# Patient Record
Sex: FEMALE | Race: WHITE | NOT HISPANIC OR LATINO | URBAN - METROPOLITAN AREA
[De-identification: names, ages, dates, MRNs, and addresses within clinical notes are randomized per-mention and may not be internally consistent; named-entity substitution may affect disease eponyms.]

---

## 2017-10-12 ENCOUNTER — EMERGENCY (EMERGENCY)
Facility: HOSPITAL | Age: 27
LOS: 0 days | Discharge: HOME | End: 2017-10-12

## 2017-10-12 DIAGNOSIS — N83.202 UNSPECIFIED OVARIAN CYST, LEFT SIDE: ICD-10-CM

## 2017-10-12 DIAGNOSIS — R10.9 UNSPECIFIED ABDOMINAL PAIN: ICD-10-CM

## 2017-10-12 DIAGNOSIS — N83.201 UNSPECIFIED OVARIAN CYST, RIGHT SIDE: ICD-10-CM

## 2017-10-12 DIAGNOSIS — O99.89 OTHER SPECIFIED DISEASES AND CONDITIONS COMPLICATING PREGNANCY, CHILDBIRTH AND THE PUERPERIUM: ICD-10-CM

## 2018-06-16 ENCOUNTER — EMERGENCY (EMERGENCY)
Facility: HOSPITAL | Age: 28
LOS: 0 days | Discharge: HOME | End: 2018-06-16
Attending: EMERGENCY MEDICINE | Admitting: EMERGENCY MEDICINE

## 2018-06-16 VITALS
SYSTOLIC BLOOD PRESSURE: 122 MMHG | OXYGEN SATURATION: 100 % | RESPIRATION RATE: 18 BRPM | HEART RATE: 84 BPM | DIASTOLIC BLOOD PRESSURE: 71 MMHG | TEMPERATURE: 96 F

## 2018-06-16 VITALS
HEART RATE: 79 BPM | HEIGHT: 70 IN | OXYGEN SATURATION: 100 % | SYSTOLIC BLOOD PRESSURE: 96 MMHG | TEMPERATURE: 98 F | RESPIRATION RATE: 18 BRPM | WEIGHT: 293 LBS | DIASTOLIC BLOOD PRESSURE: 59 MMHG

## 2018-06-16 DIAGNOSIS — R19.7 DIARRHEA, UNSPECIFIED: ICD-10-CM

## 2018-06-16 DIAGNOSIS — R68.83 CHILLS (WITHOUT FEVER): ICD-10-CM

## 2018-06-16 DIAGNOSIS — R10.84 GENERALIZED ABDOMINAL PAIN: ICD-10-CM

## 2018-06-16 DIAGNOSIS — R11.2 NAUSEA WITH VOMITING, UNSPECIFIED: ICD-10-CM

## 2018-06-16 LAB
ALBUMIN SERPL ELPH-MCNC: 4.3 G/DL — SIGNIFICANT CHANGE UP (ref 3.5–5.2)
ALP SERPL-CCNC: 58 U/L — SIGNIFICANT CHANGE UP (ref 30–115)
ALT FLD-CCNC: 18 U/L — SIGNIFICANT CHANGE UP (ref 0–41)
ANION GAP SERPL CALC-SCNC: 13 MMOL/L — SIGNIFICANT CHANGE UP (ref 7–14)
APPEARANCE UR: CLEAR — SIGNIFICANT CHANGE UP
AST SERPL-CCNC: 22 U/L — SIGNIFICANT CHANGE UP (ref 0–41)
BACTERIA # UR AUTO: (no result)
BASOPHILS # BLD AUTO: 0.01 K/UL — SIGNIFICANT CHANGE UP (ref 0–0.2)
BASOPHILS NFR BLD AUTO: 0.3 % — SIGNIFICANT CHANGE UP (ref 0–1)
BILIRUB DIRECT SERPL-MCNC: <0.2 MG/DL — SIGNIFICANT CHANGE UP (ref 0–0.2)
BILIRUB INDIRECT FLD-MCNC: >0.1 MG/DL — LOW (ref 0.2–1.2)
BILIRUB SERPL-MCNC: 0.3 MG/DL — SIGNIFICANT CHANGE UP (ref 0.2–1.2)
BILIRUB UR-MCNC: NEGATIVE — SIGNIFICANT CHANGE UP
BUN SERPL-MCNC: 10 MG/DL — SIGNIFICANT CHANGE UP (ref 10–20)
CALCIUM SERPL-MCNC: 9.3 MG/DL — SIGNIFICANT CHANGE UP (ref 8.5–10.1)
CHLORIDE SERPL-SCNC: 102 MMOL/L — SIGNIFICANT CHANGE UP (ref 98–110)
CO2 SERPL-SCNC: 23 MMOL/L — SIGNIFICANT CHANGE UP (ref 17–32)
COLOR SPEC: YELLOW — SIGNIFICANT CHANGE UP
CREAT SERPL-MCNC: 0.8 MG/DL — SIGNIFICANT CHANGE UP (ref 0.7–1.5)
DIFF PNL FLD: NEGATIVE — SIGNIFICANT CHANGE UP
EOSINOPHIL # BLD AUTO: 0.04 K/UL — SIGNIFICANT CHANGE UP (ref 0–0.7)
EOSINOPHIL NFR BLD AUTO: 1.1 % — SIGNIFICANT CHANGE UP (ref 0–8)
EPI CELLS # UR: (no result) /HPF
GLUCOSE SERPL-MCNC: 86 MG/DL — SIGNIFICANT CHANGE UP (ref 70–99)
GLUCOSE UR QL: NEGATIVE MG/DL — SIGNIFICANT CHANGE UP
HCT VFR BLD CALC: 42 % — SIGNIFICANT CHANGE UP (ref 37–47)
HGB BLD-MCNC: 14.4 G/DL — SIGNIFICANT CHANGE UP (ref 12–16)
IMM GRANULOCYTES NFR BLD AUTO: 0.3 % — SIGNIFICANT CHANGE UP (ref 0.1–0.3)
KETONES UR-MCNC: (no result)
LACTATE SERPL-SCNC: 1.1 MMOL/L — SIGNIFICANT CHANGE UP (ref 0.5–2.2)
LEUKOCYTE ESTERASE UR-ACNC: (no result)
LIDOCAIN IGE QN: 24 U/L — SIGNIFICANT CHANGE UP (ref 7–60)
LYMPHOCYTES # BLD AUTO: 0.82 K/UL — LOW (ref 1.2–3.4)
LYMPHOCYTES # BLD AUTO: 21.8 % — SIGNIFICANT CHANGE UP (ref 20.5–51.1)
MCHC RBC-ENTMCNC: 30.6 PG — SIGNIFICANT CHANGE UP (ref 27–31)
MCHC RBC-ENTMCNC: 34.3 G/DL — SIGNIFICANT CHANGE UP (ref 32–37)
MCV RBC AUTO: 89.4 FL — SIGNIFICANT CHANGE UP (ref 81–99)
MONOCYTES # BLD AUTO: 0.33 K/UL — SIGNIFICANT CHANGE UP (ref 0.1–0.6)
MONOCYTES NFR BLD AUTO: 8.8 % — SIGNIFICANT CHANGE UP (ref 1.7–9.3)
NEUTROPHILS # BLD AUTO: 2.55 K/UL — SIGNIFICANT CHANGE UP (ref 1.4–6.5)
NEUTROPHILS NFR BLD AUTO: 67.7 % — SIGNIFICANT CHANGE UP (ref 42.2–75.2)
NITRITE UR-MCNC: NEGATIVE — SIGNIFICANT CHANGE UP
NRBC # BLD: 0 /100 WBCS — SIGNIFICANT CHANGE UP (ref 0–0)
PH UR: 6 — SIGNIFICANT CHANGE UP (ref 5–8)
PLATELET # BLD AUTO: 197 K/UL — SIGNIFICANT CHANGE UP (ref 130–400)
POTASSIUM SERPL-MCNC: 3.8 MMOL/L — SIGNIFICANT CHANGE UP (ref 3.5–5)
POTASSIUM SERPL-SCNC: 3.8 MMOL/L — SIGNIFICANT CHANGE UP (ref 3.5–5)
PROT SERPL-MCNC: 7.3 G/DL — SIGNIFICANT CHANGE UP (ref 6–8)
PROT UR-MCNC: 30 MG/DL
RBC # BLD: 4.7 M/UL — SIGNIFICANT CHANGE UP (ref 4.2–5.4)
RBC # FLD: 12.3 % — SIGNIFICANT CHANGE UP (ref 11.5–14.5)
SODIUM SERPL-SCNC: 138 MMOL/L — SIGNIFICANT CHANGE UP (ref 135–146)
SP GR SPEC: >=1.03 (ref 1.01–1.03)
UROBILINOGEN FLD QL: 1 MG/DL (ref 0.2–0.2)
WBC # BLD: 3.76 K/UL — LOW (ref 4.8–10.8)
WBC # FLD AUTO: 3.76 K/UL — LOW (ref 4.8–10.8)
WBC UR QL: SIGNIFICANT CHANGE UP /HPF

## 2018-06-16 RX ORDER — SODIUM CHLORIDE 9 MG/ML
3 INJECTION INTRAMUSCULAR; INTRAVENOUS; SUBCUTANEOUS ONCE
Qty: 0 | Refills: 0 | Status: COMPLETED | OUTPATIENT
Start: 2018-06-16 | End: 2018-06-16

## 2018-06-16 RX ORDER — SODIUM CHLORIDE 9 MG/ML
1000 INJECTION INTRAMUSCULAR; INTRAVENOUS; SUBCUTANEOUS
Qty: 0 | Refills: 0 | Status: DISCONTINUED | OUTPATIENT
Start: 2018-06-16 | End: 2018-06-16

## 2018-06-16 RX ORDER — FAMOTIDINE 10 MG/ML
20 INJECTION INTRAVENOUS ONCE
Qty: 0 | Refills: 0 | Status: COMPLETED | OUTPATIENT
Start: 2018-06-16 | End: 2018-06-16

## 2018-06-16 RX ORDER — ONDANSETRON 8 MG/1
4 TABLET, FILM COATED ORAL ONCE
Qty: 0 | Refills: 0 | Status: COMPLETED | OUTPATIENT
Start: 2018-06-16 | End: 2018-06-16

## 2018-06-16 RX ORDER — SODIUM CHLORIDE 9 MG/ML
1000 INJECTION INTRAMUSCULAR; INTRAVENOUS; SUBCUTANEOUS ONCE
Qty: 0 | Refills: 0 | Status: COMPLETED | OUTPATIENT
Start: 2018-06-16 | End: 2018-06-16

## 2018-06-16 RX ADMIN — ONDANSETRON 4 MILLIGRAM(S): 8 TABLET, FILM COATED ORAL at 16:19

## 2018-06-16 RX ADMIN — SODIUM CHLORIDE 125 MILLILITER(S): 9 INJECTION INTRAMUSCULAR; INTRAVENOUS; SUBCUTANEOUS at 18:14

## 2018-06-16 RX ADMIN — FAMOTIDINE 20 MILLIGRAM(S): 10 INJECTION INTRAVENOUS at 16:19

## 2018-06-16 RX ADMIN — SODIUM CHLORIDE 125 MILLILITER(S): 9 INJECTION INTRAMUSCULAR; INTRAVENOUS; SUBCUTANEOUS at 16:19

## 2018-06-16 RX ADMIN — SODIUM CHLORIDE 1000 MILLILITER(S): 9 INJECTION INTRAMUSCULAR; INTRAVENOUS; SUBCUTANEOUS at 16:19

## 2018-06-16 RX ADMIN — SODIUM CHLORIDE 3 MILLILITER(S): 9 INJECTION INTRAMUSCULAR; INTRAVENOUS; SUBCUTANEOUS at 16:20

## 2018-06-16 NOTE — ED ADULT NURSE NOTE - RN DISCHARGE SIGNATURE
How Severe Is Your Skin Lesion?: mild
Have Your Skin Lesions Been Treated?: not been treated
Is This A New Presentation, Or A Follow-Up?: Skin Lesions
16-Jun-2018

## 2018-06-16 NOTE — ED PROVIDER NOTE - OBJECTIVE STATEMENT
28 year old female with nausea, vomiting, diarrhea and generalized abdominal pain described as crampy. patient states that she has done this multiple times over the last few days and felt weak today. denies chest pain, shortness of breath, fever, no headache.

## 2018-06-16 NOTE — ED PROVIDER NOTE - PHYSICAL EXAMINATION
Physical Exam    Vital Signs: I have reviewed the initial vital signs.  Constitutional: well-nourished, appears stated age, no acute distress  Eyes: Conjunctiva pink, Sclera clear, PERRLA, EOMI.  Cardiovascular: S1 and S2, regular rate, regular rhythm, well-perfused extremities, radial pulses equal and 2+  Respiratory: unlabored respiratory effort, clear to auscultation bilaterally no wheezing, rales and rhonchi  Gastrointestinal: soft, non-tender abdomen, no pulsatile mass, normal bowl sounds  Musculoskeletal: supple neck, no lower extremity edema, no midline tenderness  Integumentary: warm, dry, no rash  Neurologic: awake, alert, cranial nerves II-XII grossly intact, extremities’ motor and sensory functions grossly intact  Psychiatric: appropriate mood, appropriate affect

## 2018-06-16 NOTE — ED PROVIDER NOTE - ATTENDING CONTRIBUTION TO CARE
28 y F PMH ovarian cyst, pw vomiting x1 nbnb and diarrhea x 2 nbnm tannish appearance. No fever, + chills, all today. No recent abnormal foods, travel, abx exposure, or hospitalization.   Exam: NAD, NCAT, HEENT: mmm, EOMI, PERRLA, Neck: supple, nontender, nl ROM, Heart: RRR, no murmur, Lungs: BCTA, no signs of increased WOB, Abd: NTND, no guarding or rebound, no hernia palpated, no CVAT. MSK: chest, back, and ext nontender, nl rom, no deformity. Neuro: A&O, normal strength, nl sensation throughout, nl gait, normal speech and coordination.  A/P: Eval for organ injury, electrolyte abnormality, or significant signs of colitis on labs. Likely gastroenteritis.

## 2018-06-16 NOTE — ED PROVIDER NOTE - PROGRESS NOTE DETAILS
Patient at time of d/c felt much better, and labs d/w patient and family all questions answered and return to emergency room instructions given.

## 2018-06-16 NOTE — ED ADULT TRIAGE NOTE - CHIEF COMPLAINT QUOTE
diarrhea and abd painx 3 days diarrhea, abd pain nausea and bloating. Started 3 days ago. has also been feeling dizzy.

## 2018-10-03 PROBLEM — N83.209 UNSPECIFIED OVARIAN CYST, UNSPECIFIED SIDE: Chronic | Status: ACTIVE | Noted: 2018-06-16

## 2018-10-09 ENCOUNTER — OUTPATIENT (OUTPATIENT)
Dept: OUTPATIENT SERVICES | Facility: HOSPITAL | Age: 28
LOS: 1 days | Discharge: HOME | End: 2018-10-09

## 2018-10-09 DIAGNOSIS — N64.4 MASTODYNIA: ICD-10-CM

## 2019-01-09 ENCOUNTER — OUTPATIENT (OUTPATIENT)
Dept: OUTPATIENT SERVICES | Facility: HOSPITAL | Age: 29
LOS: 1 days | Discharge: HOME | End: 2019-01-09

## 2019-01-09 DIAGNOSIS — R10.819 ABDOMINAL TENDERNESS, UNSPECIFIED SITE: ICD-10-CM

## 2019-01-29 ENCOUNTER — EMERGENCY (EMERGENCY)
Facility: HOSPITAL | Age: 29
LOS: 0 days | Discharge: HOME | End: 2019-01-29
Attending: EMERGENCY MEDICINE | Admitting: EMERGENCY MEDICINE

## 2019-01-29 VITALS
SYSTOLIC BLOOD PRESSURE: 111 MMHG | TEMPERATURE: 97 F | HEART RATE: 102 BPM | OXYGEN SATURATION: 96 % | RESPIRATION RATE: 18 BRPM | WEIGHT: 203.93 LBS | DIASTOLIC BLOOD PRESSURE: 81 MMHG | HEIGHT: 70 IN

## 2019-01-29 VITALS
OXYGEN SATURATION: 97 % | SYSTOLIC BLOOD PRESSURE: 124 MMHG | DIASTOLIC BLOOD PRESSURE: 65 MMHG | RESPIRATION RATE: 18 BRPM | TEMPERATURE: 99 F | HEART RATE: 92 BPM

## 2019-01-29 DIAGNOSIS — R11.2 NAUSEA WITH VOMITING, UNSPECIFIED: ICD-10-CM

## 2019-01-29 DIAGNOSIS — R19.7 DIARRHEA, UNSPECIFIED: ICD-10-CM

## 2019-01-29 DIAGNOSIS — R10.30 LOWER ABDOMINAL PAIN, UNSPECIFIED: ICD-10-CM

## 2019-01-29 LAB
ALBUMIN SERPL ELPH-MCNC: 4.2 G/DL — SIGNIFICANT CHANGE UP (ref 3.5–5.2)
ALP SERPL-CCNC: 54 U/L — SIGNIFICANT CHANGE UP (ref 30–115)
ALT FLD-CCNC: 17 U/L — SIGNIFICANT CHANGE UP (ref 0–41)
ANION GAP SERPL CALC-SCNC: 8 MMOL/L — SIGNIFICANT CHANGE UP (ref 7–14)
APPEARANCE UR: CLEAR — SIGNIFICANT CHANGE UP
AST SERPL-CCNC: 29 U/L — SIGNIFICANT CHANGE UP (ref 0–41)
BASOPHILS # BLD AUTO: 0.02 K/UL — SIGNIFICANT CHANGE UP (ref 0–0.2)
BASOPHILS NFR BLD AUTO: 0.3 % — SIGNIFICANT CHANGE UP (ref 0–1)
BILIRUB SERPL-MCNC: 0.5 MG/DL — SIGNIFICANT CHANGE UP (ref 0.2–1.2)
BILIRUB UR-MCNC: NEGATIVE — SIGNIFICANT CHANGE UP
BUN SERPL-MCNC: 14 MG/DL — SIGNIFICANT CHANGE UP (ref 10–20)
CALCIUM SERPL-MCNC: 8.8 MG/DL — SIGNIFICANT CHANGE UP (ref 8.5–10.1)
CHLORIDE SERPL-SCNC: 103 MMOL/L — SIGNIFICANT CHANGE UP (ref 98–110)
CO2 SERPL-SCNC: 25 MMOL/L — SIGNIFICANT CHANGE UP (ref 17–32)
COLOR SPEC: YELLOW — SIGNIFICANT CHANGE UP
CREAT SERPL-MCNC: 0.9 MG/DL — SIGNIFICANT CHANGE UP (ref 0.7–1.5)
DIFF PNL FLD: NEGATIVE — SIGNIFICANT CHANGE UP
EOSINOPHIL # BLD AUTO: 0.1 K/UL — SIGNIFICANT CHANGE UP (ref 0–0.7)
EOSINOPHIL NFR BLD AUTO: 1.4 % — SIGNIFICANT CHANGE UP (ref 0–8)
GLUCOSE SERPL-MCNC: 104 MG/DL — HIGH (ref 70–99)
GLUCOSE UR QL: NEGATIVE MG/DL — SIGNIFICANT CHANGE UP
HCT VFR BLD CALC: 39.7 % — SIGNIFICANT CHANGE UP (ref 37–47)
HGB BLD-MCNC: 13.6 G/DL — SIGNIFICANT CHANGE UP (ref 12–16)
IMM GRANULOCYTES NFR BLD AUTO: 0.3 % — SIGNIFICANT CHANGE UP (ref 0.1–0.3)
KETONES UR-MCNC: NEGATIVE — SIGNIFICANT CHANGE UP
LACTATE SERPL-SCNC: 1.5 MMOL/L — SIGNIFICANT CHANGE UP (ref 0.5–2.2)
LEUKOCYTE ESTERASE UR-ACNC: ABNORMAL
LIDOCAIN IGE QN: 17 U/L — SIGNIFICANT CHANGE UP (ref 7–60)
LYMPHOCYTES # BLD AUTO: 0.53 K/UL — LOW (ref 1.2–3.4)
LYMPHOCYTES # BLD AUTO: 7.3 % — LOW (ref 20.5–51.1)
MCHC RBC-ENTMCNC: 30.6 PG — SIGNIFICANT CHANGE UP (ref 27–31)
MCHC RBC-ENTMCNC: 34.3 G/DL — SIGNIFICANT CHANGE UP (ref 32–37)
MCV RBC AUTO: 89.4 FL — SIGNIFICANT CHANGE UP (ref 81–99)
MONOCYTES # BLD AUTO: 0.21 K/UL — SIGNIFICANT CHANGE UP (ref 0.1–0.6)
MONOCYTES NFR BLD AUTO: 2.9 % — SIGNIFICANT CHANGE UP (ref 1.7–9.3)
NEUTROPHILS # BLD AUTO: 6.43 K/UL — SIGNIFICANT CHANGE UP (ref 1.4–6.5)
NEUTROPHILS NFR BLD AUTO: 87.8 % — HIGH (ref 42.2–75.2)
NITRITE UR-MCNC: NEGATIVE — SIGNIFICANT CHANGE UP
PH UR: 6.5 — SIGNIFICANT CHANGE UP (ref 5–8)
PLATELET # BLD AUTO: 208 K/UL — SIGNIFICANT CHANGE UP (ref 130–400)
POTASSIUM SERPL-MCNC: 4.4 MMOL/L — SIGNIFICANT CHANGE UP (ref 3.5–5)
POTASSIUM SERPL-SCNC: 4.4 MMOL/L — SIGNIFICANT CHANGE UP (ref 3.5–5)
PROT SERPL-MCNC: 6.9 G/DL — SIGNIFICANT CHANGE UP (ref 6–8)
PROT UR-MCNC: NEGATIVE MG/DL — SIGNIFICANT CHANGE UP
RBC # BLD: 4.44 M/UL — SIGNIFICANT CHANGE UP (ref 4.2–5.4)
RBC # FLD: 12.5 % — SIGNIFICANT CHANGE UP (ref 11.5–14.5)
SODIUM SERPL-SCNC: 136 MMOL/L — SIGNIFICANT CHANGE UP (ref 135–146)
SP GR SPEC: 1.02 — SIGNIFICANT CHANGE UP (ref 1.01–1.03)
UROBILINOGEN FLD QL: 0.2 MG/DL — SIGNIFICANT CHANGE UP (ref 0.2–0.2)
WBC # BLD: 7.31 K/UL — SIGNIFICANT CHANGE UP (ref 4.8–10.8)
WBC # FLD AUTO: 7.31 K/UL — SIGNIFICANT CHANGE UP (ref 4.8–10.8)

## 2019-01-29 RX ORDER — SODIUM CHLORIDE 9 MG/ML
1000 INJECTION, SOLUTION INTRAVENOUS ONCE
Qty: 0 | Refills: 0 | Status: COMPLETED | OUTPATIENT
Start: 2019-01-29 | End: 2019-01-29

## 2019-01-29 RX ORDER — FAMOTIDINE 10 MG/ML
20 INJECTION INTRAVENOUS ONCE
Qty: 0 | Refills: 0 | Status: COMPLETED | OUTPATIENT
Start: 2019-01-29 | End: 2019-01-29

## 2019-01-29 RX ADMIN — SODIUM CHLORIDE 2000 MILLILITER(S): 9 INJECTION, SOLUTION INTRAVENOUS at 10:33

## 2019-01-29 RX ADMIN — SODIUM CHLORIDE 2000 MILLILITER(S): 9 INJECTION, SOLUTION INTRAVENOUS at 08:42

## 2019-01-29 RX ADMIN — FAMOTIDINE 20 MILLIGRAM(S): 10 INJECTION INTRAVENOUS at 08:42

## 2019-01-29 NOTE — ED PROVIDER NOTE - PHYSICAL EXAMINATION
AOx4, Non toxic appearing, NAD, speaking in full sentences.   Skin - warm and dry, no acute rash.   Head - normocephalic, atraumatic.   Eyes - PERRLA/EOMI, conjunctiva and sclera clear.   ENT- MM moist, no nasal discharge.  Pharynx unremarkable.  No mastoid or temporal ttp.   Neck - supple nt, no meningeal signs.   Heart - slightly tachy rate, RR s1s2 nl, no rub/murmur.   Lungs- No retractions, BS equal, CTAB.   Abdomen - soft ntnd no r/g.   Extremities- moves all, brisk cap refill, sensation wnl, normal ROM. No LE edema, calves nttp b/l.

## 2019-01-29 NOTE — ED PROVIDER NOTE - NSFOLLOWUPINSTRUCTIONS_ED_ALL_ED_FT
Please follow up with your primary care doctor in 1-2 days.     Nausea / Vomiting    Nausea is the feeling that you have to vomit. As nausea gets worse, it can lead to vomiting. Vomiting puts you at an increased risk for dehydration. Older adults and people with other diseases or a weak immune system are at higher risk for dehydration. Drink clear fluids in small but frequent amounts as tolerated. Eat bland, easy-to-digest foods in small amounts as tolerated.    SEEK IMMEDIATE MEDICAL CARE IF YOU HAVE ANY OF THE FOLLOWING SYMPTOMS: fever, inability to keep sufficient fluids down, black or bloody vomitus, black or bloody stools, lightheadedness/dizziness, chest pain, severe headache, rash, shortness of breath, cold or clammy skin, confusion, pain with urination, or any signs of dehydration.

## 2019-01-29 NOTE — ED PROVIDER NOTE - OBJECTIVE STATEMENT
27 yo F with a hx of ovarian cysts presents with NVD. Started last night around 10 pm. NBNB emesis x 3, Diarrhea x 8-10 nonbloody nonmelanotic. Associated with slight abd discomfort in suprapubic / infraumbilical region, dull, nonradiating. Denies fevers, chills, CP, SOB, back pain, vaginal DC, dysuria. LMP 12 days ago. 29 yo F with a hx of ovarian cysts presents with NVD. Started last night around 10 pm. NBNB emesis x 3, Diarrhea x 8-10 nonbloody nonmelenic. Associated with slight abd discomfort in suprapubic / infraumbilical region, dull, nonradiating. Denies fevers, chills, CP, SOB, back pain, vaginal DC, dysuria. LMP 12 days ago.

## 2019-01-29 NOTE — ED PROVIDER NOTE - NS ED ROS FT
Constitutional: See HPI.  Eyes: No visual changes, eye pain or discharge. No Photophobia  ENMT: No neck pain or stiffness. No limited ROM  Cardiac: No SOB or edema. No chest pain with exertion.  Respiratory: No cough or respiratory distress. No hemoptysis.  GI: see hpi  : No dysuria, frequency or burning. No Discharge  MS: No myalgia, muscle weakness, joint pain or back pain.  Neuro: No headache or weakness. No LOC.  Skin: No skin rash.  Except as documented in the HPI, all other systems are negative.

## 2019-01-29 NOTE — ED ADULT NURSE NOTE - NSIMPLEMENTINTERV_GEN_ALL_ED
Implemented All Universal Safety Interventions:  [place] to call system. Call bell, personal items and telephone within reach. Instruct patient to call for assistance. Room bathroom lighting operational. Non-slip footwear when patient is off stretcher. Physically safe environment: no spills, clutter or unnecessary equipment. Stretcher in lowest position, wheels locked, appropriate side rails in place.

## 2019-01-29 NOTE — ED PROVIDER NOTE - ATTENDING CONTRIBUTION TO CARE
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 Patient counseled regarding conditions which should prompt return.

## 2019-01-30 LAB
CULTURE RESULTS: SIGNIFICANT CHANGE UP
SPECIMEN SOURCE: SIGNIFICANT CHANGE UP

## 2019-07-25 ENCOUNTER — EMERGENCY (EMERGENCY)
Facility: HOSPITAL | Age: 29
LOS: 0 days | Discharge: HOME | End: 2019-07-25
Attending: EMERGENCY MEDICINE | Admitting: EMERGENCY MEDICINE
Payer: COMMERCIAL

## 2019-07-25 VITALS — DIASTOLIC BLOOD PRESSURE: 61 MMHG | SYSTOLIC BLOOD PRESSURE: 101 MMHG

## 2019-07-25 VITALS
DIASTOLIC BLOOD PRESSURE: 60 MMHG | OXYGEN SATURATION: 100 % | HEIGHT: 70 IN | RESPIRATION RATE: 18 BRPM | TEMPERATURE: 97 F | SYSTOLIC BLOOD PRESSURE: 113 MMHG | HEART RATE: 63 BPM | WEIGHT: 203.93 LBS

## 2019-07-25 DIAGNOSIS — R11.0 NAUSEA: ICD-10-CM

## 2019-07-25 DIAGNOSIS — O21.9 VOMITING OF PREGNANCY, UNSPECIFIED: ICD-10-CM

## 2019-07-25 DIAGNOSIS — R19.7 DIARRHEA, UNSPECIFIED: ICD-10-CM

## 2019-07-25 DIAGNOSIS — Z3A.09 9 WEEKS GESTATION OF PREGNANCY: ICD-10-CM

## 2019-07-25 LAB
ALBUMIN SERPL ELPH-MCNC: 4 G/DL — SIGNIFICANT CHANGE UP (ref 3.5–5.2)
ALP SERPL-CCNC: 42 U/L — SIGNIFICANT CHANGE UP (ref 30–115)
ALT FLD-CCNC: 14 U/L — SIGNIFICANT CHANGE UP (ref 0–41)
ANION GAP SERPL CALC-SCNC: 12 MMOL/L — SIGNIFICANT CHANGE UP (ref 7–14)
AST SERPL-CCNC: 15 U/L — SIGNIFICANT CHANGE UP (ref 0–41)
BASOPHILS # BLD AUTO: 0.03 K/UL — SIGNIFICANT CHANGE UP (ref 0–0.2)
BASOPHILS NFR BLD AUTO: 0.4 % — SIGNIFICANT CHANGE UP (ref 0–1)
BILIRUB SERPL-MCNC: 0.2 MG/DL — SIGNIFICANT CHANGE UP (ref 0.2–1.2)
BUN SERPL-MCNC: 8 MG/DL — LOW (ref 10–20)
CALCIUM SERPL-MCNC: 9 MG/DL — SIGNIFICANT CHANGE UP (ref 8.5–10.1)
CHLORIDE SERPL-SCNC: 103 MMOL/L — SIGNIFICANT CHANGE UP (ref 98–110)
CO2 SERPL-SCNC: 20 MMOL/L — SIGNIFICANT CHANGE UP (ref 17–32)
CREAT SERPL-MCNC: 0.6 MG/DL — LOW (ref 0.7–1.5)
EOSINOPHIL # BLD AUTO: 0.15 K/UL — SIGNIFICANT CHANGE UP (ref 0–0.7)
EOSINOPHIL NFR BLD AUTO: 1.9 % — SIGNIFICANT CHANGE UP (ref 0–8)
GLUCOSE SERPL-MCNC: 91 MG/DL — SIGNIFICANT CHANGE UP (ref 70–99)
HCG SERPL-ACNC: HIGH MIU/ML
HCT VFR BLD CALC: 33 % — LOW (ref 37–47)
HGB BLD-MCNC: 11.5 G/DL — LOW (ref 12–16)
IMM GRANULOCYTES NFR BLD AUTO: 0.4 % — HIGH (ref 0.1–0.3)
LIDOCAIN IGE QN: 22 U/L — SIGNIFICANT CHANGE UP (ref 7–60)
LYMPHOCYTES # BLD AUTO: 2.45 K/UL — SIGNIFICANT CHANGE UP (ref 1.2–3.4)
LYMPHOCYTES # BLD AUTO: 30.5 % — SIGNIFICANT CHANGE UP (ref 20.5–51.1)
MAGNESIUM SERPL-MCNC: 1.6 MG/DL — LOW (ref 1.8–2.4)
MCHC RBC-ENTMCNC: 31.3 PG — HIGH (ref 27–31)
MCHC RBC-ENTMCNC: 34.8 G/DL — SIGNIFICANT CHANGE UP (ref 32–37)
MCV RBC AUTO: 89.7 FL — SIGNIFICANT CHANGE UP (ref 81–99)
MONOCYTES # BLD AUTO: 0.56 K/UL — SIGNIFICANT CHANGE UP (ref 0.1–0.6)
MONOCYTES NFR BLD AUTO: 7 % — SIGNIFICANT CHANGE UP (ref 1.7–9.3)
NEUTROPHILS # BLD AUTO: 4.82 K/UL — SIGNIFICANT CHANGE UP (ref 1.4–6.5)
NEUTROPHILS NFR BLD AUTO: 59.8 % — SIGNIFICANT CHANGE UP (ref 42.2–75.2)
NRBC # BLD: 0 /100 WBCS — SIGNIFICANT CHANGE UP (ref 0–0)
PLATELET # BLD AUTO: 222 K/UL — SIGNIFICANT CHANGE UP (ref 130–400)
POTASSIUM SERPL-MCNC: 3.8 MMOL/L — SIGNIFICANT CHANGE UP (ref 3.5–5)
POTASSIUM SERPL-SCNC: 3.8 MMOL/L — SIGNIFICANT CHANGE UP (ref 3.5–5)
PROT SERPL-MCNC: 6.5 G/DL — SIGNIFICANT CHANGE UP (ref 6–8)
RBC # BLD: 3.68 M/UL — LOW (ref 4.2–5.4)
RBC # FLD: 11.9 % — SIGNIFICANT CHANGE UP (ref 11.5–14.5)
SODIUM SERPL-SCNC: 135 MMOL/L — SIGNIFICANT CHANGE UP (ref 135–146)
WBC # BLD: 8.04 K/UL — SIGNIFICANT CHANGE UP (ref 4.8–10.8)
WBC # FLD AUTO: 8.04 K/UL — SIGNIFICANT CHANGE UP (ref 4.8–10.8)

## 2019-07-25 PROCEDURE — 99284 EMERGENCY DEPT VISIT MOD MDM: CPT

## 2019-07-25 RX ORDER — MAGNESIUM SULFATE 500 MG/ML
2 VIAL (ML) INJECTION ONCE
Refills: 0 | Status: COMPLETED | OUTPATIENT
Start: 2019-07-25 | End: 2019-07-25

## 2019-07-25 RX ORDER — SODIUM CHLORIDE 9 MG/ML
1000 INJECTION INTRAMUSCULAR; INTRAVENOUS; SUBCUTANEOUS ONCE
Refills: 0 | Status: COMPLETED | OUTPATIENT
Start: 2019-07-25 | End: 2019-07-25

## 2019-07-25 RX ORDER — ONDANSETRON 8 MG/1
1 TABLET, FILM COATED ORAL
Qty: 6 | Refills: 0
Start: 2019-07-25 | End: 2019-07-26

## 2019-07-25 RX ORDER — ONDANSETRON 8 MG/1
4 TABLET, FILM COATED ORAL ONCE
Refills: 0 | Status: COMPLETED | OUTPATIENT
Start: 2019-07-25 | End: 2019-07-25

## 2019-07-25 RX ADMIN — ONDANSETRON 4 MILLIGRAM(S): 8 TABLET, FILM COATED ORAL at 21:46

## 2019-07-25 RX ADMIN — Medication 50 GRAM(S): at 23:22

## 2019-07-25 RX ADMIN — SODIUM CHLORIDE 1000 MILLILITER(S): 9 INJECTION INTRAMUSCULAR; INTRAVENOUS; SUBCUTANEOUS at 21:45

## 2019-07-25 NOTE — ED PROVIDER NOTE - OBJECTIVE STATEMENT
30 yo  female 9 weeks pregnant LMP -  present c/o nausea/vomiting and diarrhea x few days. 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 denies other sick contact and recent travel and abx use. multiple clear vomitus and NB diarrhea. last vomiting and diarrhea were 20 minutes ago. Denies fever/chill/ vaginal bleeding and discharge/ abdominal pain/ change of appetite/constipation and urinary sxs. Denies HA/dizziness/chest pain/sob/palpitations and pain to extremities and ambulatory difficulty.

## 2019-07-25 NOTE — ED PROVIDER NOTE - CLINICAL SUMMARY MEDICAL DECISION MAKING FREE TEXT BOX
Lab work remarkable for hypomagnesemia. repleted. tolerating po. Given Zofran po at home. discussed medication safety during pregnancy with Zofran. Pt voiced understanding and agrees with plan. Feels and appears well. No complaints at present. Eager to leave. Stable for discharge. Patient was given strict return and follow up precautions. The patient has been informed of all concerning signs and symptoms to return to Emergency Department, the necessity to follow up with PMD/Clinic/follow up provided within 2-3 days was explained, and the patient reports understanding of above with capacity and insight.

## 2019-07-25 NOTE — ED PROVIDER NOTE - PHYSICAL EXAMINATION
CONSTITUTIONAL: Well-appearing; well-nourished; in no apparent distress.   EYES: PERRL; EOM intact.   ENT: normal nose; no rhinorrhea; normal pharynx with no tonsillar hypertrophy. dry lips and tongue  CARDIOVASCULAR: Normal S1, S2; no murmurs, rubs, or gallops.   RESPIRATORY: Normal chest excursion with respiration; breath sounds clear and equal bilaterally; no wheezes, rhonchi, or rales.  GI/: Normal bowel sounds; non-distended; non-tender; no palpable organomegaly.   SKIN: Normal for age and race; warm; dry; good turgor; no apparent lesions or exudate.   NEURO/PSYCH: A & O x 4; grossly unremarkable.

## 2019-07-25 NOTE — ED PROVIDER NOTE - ATTENDING CONTRIBUTION TO CARE
PT is A1 ega 9wks with IUP on prior U/s presenting with N/V without abd pain, cramping vaginal bleeding ro discharge.    VITAL SIGNS: noted  CONSTITUTIONAL: Well-developed; well-nourished; in no acute distress  HEAD: Normocephalic; atraumatic  EYES: conjunctiva and sclera clear  ENT: No nasal discharge; airway clear. MMM  NECK: Supple; non tender. No anterior cervical lymphadenopathy noted  CARD: Regular rate and rhythm  RESP: CTAB/L, no wheezes, rales or rhonchi  ABD: Normal bowel sounds; soft; non-distended; non-tender; No CVAT  EXT: Normal ROM. No calf tenderness or edema. Distal pulses intact  NEURO: Alert, oriented. Grossly unremarkable. No focal deficits  SKIN: Skin exam is warm and dry, no acute rash  MS: No midline spinal tenderness     - n/v, no abd pain - LABS/UA/HCG/antiemetic/reassess

## 2020-03-28 ENCOUNTER — EMERGENCY (EMERGENCY)
Facility: HOSPITAL | Age: 30
LOS: 0 days | Discharge: HOME | End: 2020-03-28
Attending: EMERGENCY MEDICINE | Admitting: EMERGENCY MEDICINE
Payer: COMMERCIAL

## 2020-03-28 VITALS
HEART RATE: 58 BPM | TEMPERATURE: 99 F | DIASTOLIC BLOOD PRESSURE: 64 MMHG | OXYGEN SATURATION: 98 % | RESPIRATION RATE: 20 BRPM | SYSTOLIC BLOOD PRESSURE: 111 MMHG

## 2020-03-28 VITALS
TEMPERATURE: 96 F | SYSTOLIC BLOOD PRESSURE: 139 MMHG | RESPIRATION RATE: 24 BRPM | DIASTOLIC BLOOD PRESSURE: 814 MMHG | HEART RATE: 72 BPM | OXYGEN SATURATION: 97 %

## 2020-03-28 DIAGNOSIS — R10.9 UNSPECIFIED ABDOMINAL PAIN: ICD-10-CM

## 2020-03-28 DIAGNOSIS — N20.0 CALCULUS OF KIDNEY: ICD-10-CM

## 2020-03-28 PROBLEM — O03.9 COMPLETE OR UNSPECIFIED SPONTANEOUS ABORTION WITHOUT COMPLICATION: Chronic | Status: ACTIVE | Noted: 2019-07-25

## 2020-03-28 LAB
ALBUMIN SERPL ELPH-MCNC: 4.3 G/DL — SIGNIFICANT CHANGE UP (ref 3.5–5.2)
ALP SERPL-CCNC: 69 U/L — SIGNIFICANT CHANGE UP (ref 30–115)
ALT FLD-CCNC: 35 U/L — SIGNIFICANT CHANGE UP (ref 0–41)
ANION GAP SERPL CALC-SCNC: 19 MMOL/L — HIGH (ref 7–14)
APPEARANCE UR: CLEAR — SIGNIFICANT CHANGE UP
AST SERPL-CCNC: 26 U/L — SIGNIFICANT CHANGE UP (ref 0–41)
BACTERIA # UR AUTO: NEGATIVE — SIGNIFICANT CHANGE UP
BASOPHILS # BLD AUTO: 0.02 K/UL — SIGNIFICANT CHANGE UP (ref 0–0.2)
BASOPHILS NFR BLD AUTO: 0.3 % — SIGNIFICANT CHANGE UP (ref 0–1)
BILIRUB SERPL-MCNC: 0.3 MG/DL — SIGNIFICANT CHANGE UP (ref 0.2–1.2)
BILIRUB UR-MCNC: NEGATIVE — SIGNIFICANT CHANGE UP
BUN SERPL-MCNC: 13 MG/DL — SIGNIFICANT CHANGE UP (ref 10–20)
CALCIUM SERPL-MCNC: 10 MG/DL — SIGNIFICANT CHANGE UP (ref 8.5–10.1)
CHLORIDE SERPL-SCNC: 106 MMOL/L — SIGNIFICANT CHANGE UP (ref 98–110)
CO2 SERPL-SCNC: 17 MMOL/L — SIGNIFICANT CHANGE UP (ref 17–32)
COLOR SPEC: SIGNIFICANT CHANGE UP
CREAT SERPL-MCNC: 1.1 MG/DL — SIGNIFICANT CHANGE UP (ref 0.7–1.5)
DIFF PNL FLD: ABNORMAL
EOSINOPHIL # BLD AUTO: 0.09 K/UL — SIGNIFICANT CHANGE UP (ref 0–0.7)
EOSINOPHIL NFR BLD AUTO: 1.2 % — SIGNIFICANT CHANGE UP (ref 0–8)
EPI CELLS # UR: 4 /HPF — SIGNIFICANT CHANGE UP (ref 0–5)
GLUCOSE SERPL-MCNC: 96 MG/DL — SIGNIFICANT CHANGE UP (ref 70–99)
GLUCOSE UR QL: NEGATIVE — SIGNIFICANT CHANGE UP
HCT VFR BLD CALC: 36.9 % — LOW (ref 37–47)
HGB BLD-MCNC: 12.8 G/DL — SIGNIFICANT CHANGE UP (ref 12–16)
HYALINE CASTS # UR AUTO: 2 /LPF — SIGNIFICANT CHANGE UP (ref 0–7)
IMM GRANULOCYTES NFR BLD AUTO: 0.4 % — HIGH (ref 0.1–0.3)
KETONES UR-MCNC: NEGATIVE — SIGNIFICANT CHANGE UP
LACTATE SERPL-SCNC: 2.9 MMOL/L — HIGH (ref 0.7–2)
LEUKOCYTE ESTERASE UR-ACNC: ABNORMAL
LIDOCAIN IGE QN: 34 U/L — SIGNIFICANT CHANGE UP (ref 7–60)
LYMPHOCYTES # BLD AUTO: 1.24 K/UL — SIGNIFICANT CHANGE UP (ref 1.2–3.4)
LYMPHOCYTES # BLD AUTO: 15.9 % — LOW (ref 20.5–51.1)
MCHC RBC-ENTMCNC: 29.7 PG — SIGNIFICANT CHANGE UP (ref 27–31)
MCHC RBC-ENTMCNC: 34.7 G/DL — SIGNIFICANT CHANGE UP (ref 32–37)
MCV RBC AUTO: 85.6 FL — SIGNIFICANT CHANGE UP (ref 81–99)
MONOCYTES # BLD AUTO: 0.37 K/UL — SIGNIFICANT CHANGE UP (ref 0.1–0.6)
MONOCYTES NFR BLD AUTO: 4.7 % — SIGNIFICANT CHANGE UP (ref 1.7–9.3)
NEUTROPHILS # BLD AUTO: 6.05 K/UL — SIGNIFICANT CHANGE UP (ref 1.4–6.5)
NEUTROPHILS NFR BLD AUTO: 77.5 % — HIGH (ref 42.2–75.2)
NITRITE UR-MCNC: NEGATIVE — SIGNIFICANT CHANGE UP
NRBC # BLD: 0 /100 WBCS — SIGNIFICANT CHANGE UP (ref 0–0)
PH UR: 8 — SIGNIFICANT CHANGE UP (ref 5–8)
PLATELET # BLD AUTO: 286 K/UL — SIGNIFICANT CHANGE UP (ref 130–400)
POTASSIUM SERPL-MCNC: 3.7 MMOL/L — SIGNIFICANT CHANGE UP (ref 3.5–5)
POTASSIUM SERPL-SCNC: 3.7 MMOL/L — SIGNIFICANT CHANGE UP (ref 3.5–5)
PROT SERPL-MCNC: 7.2 G/DL — SIGNIFICANT CHANGE UP (ref 6–8)
PROT UR-MCNC: SIGNIFICANT CHANGE UP
RBC # BLD: 4.31 M/UL — SIGNIFICANT CHANGE UP (ref 4.2–5.4)
RBC # FLD: 12.6 % — SIGNIFICANT CHANGE UP (ref 11.5–14.5)
RBC CASTS # UR COMP ASSIST: 25 /HPF — HIGH (ref 0–4)
SODIUM SERPL-SCNC: 142 MMOL/L — SIGNIFICANT CHANGE UP (ref 135–146)
SP GR SPEC: 1.01 — LOW (ref 1.01–1.02)
UROBILINOGEN FLD QL: SIGNIFICANT CHANGE UP
WBC # BLD: 7.8 K/UL — SIGNIFICANT CHANGE UP (ref 4.8–10.8)
WBC # FLD AUTO: 7.8 K/UL — SIGNIFICANT CHANGE UP (ref 4.8–10.8)
WBC UR QL: 6 /HPF — HIGH (ref 0–5)

## 2020-03-28 PROCEDURE — 99285 EMERGENCY DEPT VISIT HI MDM: CPT

## 2020-03-28 PROCEDURE — 74177 CT ABD & PELVIS W/CONTRAST: CPT | Mod: 26

## 2020-03-28 PROCEDURE — 76856 US EXAM PELVIC COMPLETE: CPT | Mod: 26

## 2020-03-28 RX ORDER — TAMSULOSIN HYDROCHLORIDE 0.4 MG/1
1 CAPSULE ORAL
Qty: 14 | Refills: 0
Start: 2020-03-28 | End: 2020-04-10

## 2020-03-28 RX ORDER — MORPHINE SULFATE 50 MG/1
6 CAPSULE, EXTENDED RELEASE ORAL ONCE
Refills: 0 | Status: DISCONTINUED | OUTPATIENT
Start: 2020-03-28 | End: 2020-03-28

## 2020-03-28 RX ORDER — ONDANSETRON 8 MG/1
4 TABLET, FILM COATED ORAL ONCE
Refills: 0 | Status: COMPLETED | OUTPATIENT
Start: 2020-03-28 | End: 2020-03-28

## 2020-03-28 RX ORDER — SODIUM CHLORIDE 9 MG/ML
1000 INJECTION INTRAMUSCULAR; INTRAVENOUS; SUBCUTANEOUS ONCE
Refills: 0 | Status: COMPLETED | OUTPATIENT
Start: 2020-03-28 | End: 2020-03-28

## 2020-03-28 RX ORDER — KETOROLAC TROMETHAMINE 30 MG/ML
1 SYRINGE (ML) INJECTION
Qty: 10 | Refills: 0
Start: 2020-03-28 | End: 2020-04-01

## 2020-03-28 RX ADMIN — ONDANSETRON 4 MILLIGRAM(S): 8 TABLET, FILM COATED ORAL at 11:31

## 2020-03-28 RX ADMIN — SODIUM CHLORIDE 1000 MILLILITER(S): 9 INJECTION INTRAMUSCULAR; INTRAVENOUS; SUBCUTANEOUS at 11:31

## 2020-03-28 RX ADMIN — MORPHINE SULFATE 6 MILLIGRAM(S): 50 CAPSULE, EXTENDED RELEASE ORAL at 11:31

## 2020-03-28 NOTE — ED PROVIDER NOTE - PATIENT PORTAL LINK FT
You can access the FollowMyHealth Patient Portal offered by [facility] by registering at the following website: [email or web address]. By joining [facility]’s FollowMyHealth portal, you will also be able to view your health information using other applications (apps) compatible with our system.

## 2020-03-28 NOTE — ED PROVIDER NOTE - CARE PROVIDERS DIRECT ADDRESSES
[email or web address]

## 2020-03-28 NOTE — ED PROVIDER NOTE - PHYSICAL EXAMINATION
Vital signs reviewed  GENERAL: Patient appears uncomfortable/in pain  HEAD: NCAT  EYES: Anicteric  ENT: MMM  RESPIRATORY: Normal respiratory effort. CTA B/L. No wheezing, rales, rhonchi  CARDIOVASCULAR: Regular rate and rhythm  ABDOMEN: Soft. Nondistended. LLQ TTP. No guarding or rebound. No CVA tenderness.  MUSCULOSKELETAL/EXTREMITIES: Brisk cap refill. Equal radial pulses. No leg edema.  SKIN:  Warm and dry  NEURO: AAOx3. No gross FND.

## 2020-03-28 NOTE — ED PROVIDER NOTE - OBJECTIVE STATEMENT
31yo F, , 3 weeks postpartum (), with PMHx ovarian cysts, presents for abd pain since this morning, LLQ, severe, nonradiating, constant, sharp/twisting. Assoc with nausea. One episode of NBNB emesis while I was obtaining history from her in the ED. Denies fever, diarrhea, urinary symptoms. No history of abdominal surgeries. Denies headache, lightheadedness, CP, SOB, cough, leg swelling, rash. Denies vaginal bleeding or discharge.

## 2020-03-28 NOTE — ED ADULT TRIAGE NOTE - CHIEF COMPLAINT QUOTE
"I have bad abdominal pain since this morning." Pt reports RLE pain and nausea. Pt 3 weeks post-partum.

## 2020-03-28 NOTE — ED PROVIDER NOTE - NS ED ROS FT
Constitutional: No fever, chills.  ENMT:  No neck pain  Cardiac:  No chest pain  Respiratory:  No cough, SOB  GI:  No diarrhea. +nausea, vomiting, abdominal pain.  :  No dysuria, hematuria  MS:  No back pain.  Neuro:  No headache or lightheadedness  Skin:  No skin rash  Endocrine: No history of thyroid disease or diabetes.  Except as documented in the HPI,  all other systems are negative.

## 2020-03-28 NOTE — ED PROVIDER NOTE - CLINICAL SUMMARY MEDICAL DECISION MAKING FREE TEXT BOX
29yo F p/w LLQ abd pain since this morning. CT showing kidney stone [id number]. Mild hydro. Flomax, fluids, pain control, urology f/u.

## 2020-03-28 NOTE — ED PROVIDER NOTE - CARE PROVIDER_API CALL
Kaley Chacko)  Urology  [address] [place] [address], [address]  [place] [address]  Phone: [phone]  Fax: [phone]  Follow Up Time:

## 2020-03-29 LAB
CULTURE RESULTS: SIGNIFICANT CHANGE UP
SPECIMEN SOURCE: SIGNIFICANT CHANGE UP

## 2020-04-02 PROBLEM — Z00.00 ENCOUNTER FOR PREVENTIVE HEALTH EXAMINATION: Status: ACTIVE | Noted: 2020-04-02

## 2020-05-14 ENCOUNTER — TRANSCRIPTION ENCOUNTER (OUTPATIENT)
Age: 30
End: 2020-05-14

## 2020-05-14 ENCOUNTER — APPOINTMENT (OUTPATIENT)
Dept: UROLOGY | Facility: CLINIC | Age: 30
End: 2020-05-14
Payer: COMMERCIAL

## 2020-05-14 VITALS
HEIGHT: 70 IN | HEART RATE: 70 BPM | SYSTOLIC BLOOD PRESSURE: 120 MMHG | BODY MASS INDEX: 31.5 KG/M2 | WEIGHT: 220 LBS | DIASTOLIC BLOOD PRESSURE: 80 MMHG | TEMPERATURE: 98.1 F

## 2020-05-14 DIAGNOSIS — Z78.9 OTHER SPECIFIED HEALTH STATUS: ICD-10-CM

## 2020-05-14 PROCEDURE — 99204 OFFICE O/P NEW MOD 45 MIN: CPT

## 2020-05-14 NOTE — PHYSICAL EXAM
[General Appearance - Well Nourished] : well nourished [General Appearance - Well Developed] : well developed [Normal Appearance] : normal appearance [Well Groomed] : well groomed [General Appearance - In No Acute Distress] : no acute distress [Edema] : no peripheral edema [Exaggerated Use Of Accessory Muscles For Inspiration] : no accessory muscle use [Respiration, Rhythm And Depth] : normal respiratory rhythm and effort [Abdomen Soft] : soft [Abdomen Tenderness] : non-tender [Costovertebral Angle Tenderness] : no ~M costovertebral angle tenderness [Urinary Bladder Findings] : the bladder was normal on palpation [Normal Station and Gait] : the gait and station were normal for the patient's age [] : no rash [No Focal Deficits] : no focal deficits [Oriented To Time, Place, And Person] : oriented to person, place, and time [Affect] : the affect was normal [Mood] : the mood was normal [Not Anxious] : not anxious [No Palpable Adenopathy] : no palpable adenopathy

## 2020-05-14 NOTE — HISTORY OF PRESENT ILLNESS
[FreeTextEntry1] : SAWYER UNDERWOOD is a 30 year old female who presents for consultation for ureteral stone. \par \par A March 28 2020 patient developed severe intermittent left flank pain and was found to have a 5 mm distal ureteral stone .\par CT abdomen and pelvis images visualized demonstrating very mild left hydroureteronephrosis. Difficult to see the stone in the left ureter as there are multiple calcifications however it appears to be near the ureterovesical junction.\par \par Denies lower urinary tract symptoms. This is the first episode obstructing ureteral stone however patient does know of a nonobstructing stone on prior imaging\par Denies gross hematuria, dysuria or associated symptoms. Has not had flank pain 2 weeks.\par \par Denies  PMH including previous kidney stones, recurrent UTIs. \par Family History: No  malignancies\par Social History: paraprofessional \par \par Cr 1.1 3/2020\par

## 2020-05-14 NOTE — ASSESSMENT
[FreeTextEntry1] : SAWYER UNDERWOOD is a 30 year old female who presents for consultation for ureteral stone. \par Asymptomatic.\par Recommend RBUS confirm passage. fu after\par already on flomax

## 2020-05-20 ENCOUNTER — OUTPATIENT (OUTPATIENT)
Dept: OUTPATIENT SERVICES | Facility: HOSPITAL | Age: 30
LOS: 1 days | Discharge: HOME | End: 2020-05-20
Payer: COMMERCIAL

## 2020-05-20 DIAGNOSIS — N20.0 CALCULUS OF KIDNEY: ICD-10-CM

## 2020-05-20 PROCEDURE — 76770 US EXAM ABDO BACK WALL COMP: CPT | Mod: 26

## 2020-05-26 ENCOUNTER — APPOINTMENT (OUTPATIENT)
Dept: UROLOGY | Facility: CLINIC | Age: 30
End: 2020-05-26
Payer: COMMERCIAL

## 2020-05-26 PROCEDURE — 99214 OFFICE O/P EST MOD 30 MIN: CPT | Mod: 95

## 2020-05-26 NOTE — HISTORY OF PRESENT ILLNESS
[Other Location: e.g. School (Enter Location, City,State)___] : at [unfilled], at the time of the visit. [Medical Office: ([facility])___] : at the medical office located in  [Verbal consent obtained from patient] : the patient, [unfilled] [FreeTextEntry1] : SAWYER UNDERWOOD is a 30 year old female who presents for consultation for ureteral stone. \par \par Reports she still has mild intermittent left flank pain.\par Patient presents for followup after renal bladder ultrasound.\par RBUS images reviewed may 2020 demonstrating 4 mm likely ureteral stone echogenic focus with posterior shadowing\par \par A March 28 2020 patient developed severe intermittent left flank pain and was found to have a 5 mm distal ureteral stone .\par CT abdomen and pelvis images visualized demonstrating very mild left hydroureteronephrosis. Difficult to see the stone in the left ureter as there are multiple calcifications however it appears to be near the ureterovesical junction.\par \par Denies lower urinary tract symptoms. This is the first episode obstructing ureteral stone however patient does know of a nonobstructing stone on prior imaging\par Denies gross hematuria, dysuria or associated symptoms. Has not had flank pain 2 weeks.\par \par Denies  PMH including previous kidney stones, recurrent UTIs. \par Family History: No  malignancies\par Social History: paraprofessional \par \par Cr 1.1 3/2020\par UCx contam\par

## 2020-05-26 NOTE — ASSESSMENT
[FreeTextEntry1] : SAWYER UNDERWOOD is a 30 year old female who presents for consultation for ureteral stone, sono c/w persistent stone and pt is symptomatic.  Discussed CT repeat vs surgery and pt would prefer surgical intervention.\par Plan for ureteroscopy, laser lithotripsy ureteral stent insertion\par \par Our stone treatment discussion summarized--\par 1. Surveillance: we discussed risks associated with this approach including increase in size of stone, UTIs, renal obstruction.\par \par 2. URS/LL: we discussed how this is done (transurethrally with small cameras, baskets and a laser), the risks (bleeding, infection, damage to  organs, stricture, inability to access the ureter, stent pain which can be mild, moderate or severe) and benefits (minimally invasive). The patient also understands that if our scopes will not fit into the ureter because the ureter is too small, the patient will be stented and left to dilate with a stent ~2 weeks. We will then re-attempt the ureteroscopy. \par \par 3. ESWL: we discussed how this procedure is performed (transcorporeal shock waves under light anesthesia), the risks (bleeding, failure to fragment stones, steinstrasse) and benefits (least invasive technique). \par \par For these treatment, we also discussed the possible need for additional therapies for persistent stone burden. \par We stressed that when ureteral stents are inserted they are temporary and must be removed within 3 months of placement. Otherwise encrustation, urosepsis, obstruction can occur.\par \par

## 2020-05-29 ENCOUNTER — OUTPATIENT (OUTPATIENT)
Dept: OUTPATIENT SERVICES | Facility: HOSPITAL | Age: 30
LOS: 1 days | Discharge: HOME | End: 2020-05-29

## 2020-05-29 VITALS
OXYGEN SATURATION: 97 % | DIASTOLIC BLOOD PRESSURE: 60 MMHG | HEART RATE: 80 BPM | SYSTOLIC BLOOD PRESSURE: 120 MMHG | RESPIRATION RATE: 16 BRPM | WEIGHT: 201.94 LBS | HEIGHT: 70 IN | TEMPERATURE: 98 F

## 2020-05-29 DIAGNOSIS — Z01.818 ENCOUNTER FOR OTHER PREPROCEDURAL EXAMINATION: ICD-10-CM

## 2020-05-29 DIAGNOSIS — Z98.890 OTHER SPECIFIED POSTPROCEDURAL STATES: Chronic | ICD-10-CM

## 2020-05-29 DIAGNOSIS — N13.30 UNSPECIFIED HYDRONEPHROSIS: ICD-10-CM

## 2020-05-29 DIAGNOSIS — Z98.82 BREAST IMPLANT STATUS: Chronic | ICD-10-CM

## 2020-05-29 LAB
ALBUMIN SERPL ELPH-MCNC: 4.5 G/DL — SIGNIFICANT CHANGE UP (ref 3.5–5.2)
ALP SERPL-CCNC: 67 U/L — SIGNIFICANT CHANGE UP (ref 30–115)
ALT FLD-CCNC: 35 U/L — SIGNIFICANT CHANGE UP (ref 0–41)
ANION GAP SERPL CALC-SCNC: 12 MMOL/L — SIGNIFICANT CHANGE UP (ref 7–14)
APTT BLD: 32.8 SEC — SIGNIFICANT CHANGE UP (ref 27–39.2)
AST SERPL-CCNC: 23 U/L — SIGNIFICANT CHANGE UP (ref 0–41)
BASOPHILS # BLD AUTO: 0.04 K/UL — SIGNIFICANT CHANGE UP (ref 0–0.2)
BASOPHILS NFR BLD AUTO: 0.8 % — SIGNIFICANT CHANGE UP (ref 0–1)
BILIRUB SERPL-MCNC: <0.2 MG/DL — SIGNIFICANT CHANGE UP (ref 0.2–1.2)
BUN SERPL-MCNC: 13 MG/DL — SIGNIFICANT CHANGE UP (ref 10–20)
CALCIUM SERPL-MCNC: 9.3 MG/DL — SIGNIFICANT CHANGE UP (ref 8.5–10.1)
CHLORIDE SERPL-SCNC: 104 MMOL/L — SIGNIFICANT CHANGE UP (ref 98–110)
CO2 SERPL-SCNC: 24 MMOL/L — SIGNIFICANT CHANGE UP (ref 17–32)
CREAT SERPL-MCNC: 0.7 MG/DL — SIGNIFICANT CHANGE UP (ref 0.7–1.5)
EOSINOPHIL # BLD AUTO: 0.11 K/UL — SIGNIFICANT CHANGE UP (ref 0–0.7)
EOSINOPHIL NFR BLD AUTO: 2.1 % — SIGNIFICANT CHANGE UP (ref 0–8)
GLUCOSE SERPL-MCNC: 80 MG/DL — SIGNIFICANT CHANGE UP (ref 70–99)
HCT VFR BLD CALC: 38.2 % — SIGNIFICANT CHANGE UP (ref 37–47)
HGB BLD-MCNC: 12.6 G/DL — SIGNIFICANT CHANGE UP (ref 12–16)
IMM GRANULOCYTES NFR BLD AUTO: 0.4 % — HIGH (ref 0.1–0.3)
INR BLD: 0.96 RATIO — SIGNIFICANT CHANGE UP (ref 0.65–1.3)
LYMPHOCYTES # BLD AUTO: 1.73 K/UL — SIGNIFICANT CHANGE UP (ref 1.2–3.4)
LYMPHOCYTES # BLD AUTO: 33.4 % — SIGNIFICANT CHANGE UP (ref 20.5–51.1)
MCHC RBC-ENTMCNC: 28.8 PG — SIGNIFICANT CHANGE UP (ref 27–31)
MCHC RBC-ENTMCNC: 33 G/DL — SIGNIFICANT CHANGE UP (ref 32–37)
MCV RBC AUTO: 87.4 FL — SIGNIFICANT CHANGE UP (ref 81–99)
MONOCYTES # BLD AUTO: 0.42 K/UL — SIGNIFICANT CHANGE UP (ref 0.1–0.6)
MONOCYTES NFR BLD AUTO: 8.1 % — SIGNIFICANT CHANGE UP (ref 1.7–9.3)
NEUTROPHILS # BLD AUTO: 2.86 K/UL — SIGNIFICANT CHANGE UP (ref 1.4–6.5)
NEUTROPHILS NFR BLD AUTO: 55.2 % — SIGNIFICANT CHANGE UP (ref 42.2–75.2)
NRBC # BLD: 0 /100 WBCS — SIGNIFICANT CHANGE UP (ref 0–0)
PLATELET # BLD AUTO: 306 K/UL — SIGNIFICANT CHANGE UP (ref 130–400)
POTASSIUM SERPL-MCNC: 4.4 MMOL/L — SIGNIFICANT CHANGE UP (ref 3.5–5)
POTASSIUM SERPL-SCNC: 4.4 MMOL/L — SIGNIFICANT CHANGE UP (ref 3.5–5)
PROT SERPL-MCNC: 7.4 G/DL — SIGNIFICANT CHANGE UP (ref 6–8)
PROTHROM AB SERPL-ACNC: 11 SEC — SIGNIFICANT CHANGE UP (ref 9.95–12.87)
RBC # BLD: 4.37 M/UL — SIGNIFICANT CHANGE UP (ref 4.2–5.4)
RBC # FLD: 14.6 % — HIGH (ref 11.5–14.5)
SODIUM SERPL-SCNC: 140 MMOL/L — SIGNIFICANT CHANGE UP (ref 135–146)
WBC # BLD: 5.18 K/UL — SIGNIFICANT CHANGE UP (ref 4.8–10.8)
WBC # FLD AUTO: 5.18 K/UL — SIGNIFICANT CHANGE UP (ref 4.8–10.8)

## 2020-05-29 NOTE — H&P PST ADULT - NSICDXPASTMEDICALHX_GEN_ALL_CORE_FT
PAST MEDICAL HISTORY:  Asthma rare epsisodes- last use of albuterol 5yrs ago    Miscarriage     Ovarian cyst

## 2020-05-29 NOTE — H&P PST ADULT - NSANTHOSAYNRD_GEN_A_CORE
No. NILE screening performed.  STOP BANG Legend: 0-2 = LOW Risk; 3-4 = INTERMEDIATE Risk; 5-8 = HIGH Risk

## 2020-05-29 NOTE — H&P PST ADULT - HISTORY OF PRESENT ILLNESS
30yr old female states has LEFT kidney stone -was admitted to [facility] Mar 28- for renal colic-was found to have LEFT HYDRONEPHROSIS. Presents to pretesting for CYSTOSCOPY, LEFT URETEROSCOPY-LASER LITHOTRIPSY WITH URETERAL STENT PLACEMENT. Denies c/o CP, PALP, FEVER, RASH, N/V/D, rash, cough, sorethroat, malaise, loss of sense of smell/taste. Denies sick contacts. Delivered a full term baby MAR-4 2020. not breastfeeding. Exercise elke 4-5 FOS.

## 2020-05-30 LAB
T4 FREE SERPL-MCNC: 1.3 NG/DL — SIGNIFICANT CHANGE UP (ref 0.9–1.8)
TSH SERPL-MCNC: 1.9 UIU/ML — SIGNIFICANT CHANGE UP (ref 0.27–4.2)

## 2020-06-01 ENCOUNTER — APPOINTMENT (OUTPATIENT)
Dept: UROLOGY | Facility: CLINIC | Age: 30
End: 2020-06-01

## 2020-06-01 LAB
CULTURE RESULTS: SIGNIFICANT CHANGE UP
SPECIMEN SOURCE: SIGNIFICANT CHANGE UP

## 2020-06-05 ENCOUNTER — OUTPATIENT (OUTPATIENT)
Dept: OUTPATIENT SERVICES | Facility: HOSPITAL | Age: 30
LOS: 1 days | Discharge: HOME | End: 2020-06-05
Payer: COMMERCIAL

## 2020-06-05 ENCOUNTER — APPOINTMENT (OUTPATIENT)
Dept: UROLOGY | Facility: HOSPITAL | Age: 30
End: 2020-06-05

## 2020-06-05 VITALS — DIASTOLIC BLOOD PRESSURE: 68 MMHG | SYSTOLIC BLOOD PRESSURE: 112 MMHG | HEART RATE: 58 BPM | RESPIRATION RATE: 17 BRPM

## 2020-06-05 VITALS
DIASTOLIC BLOOD PRESSURE: 71 MMHG | RESPIRATION RATE: 17 BRPM | TEMPERATURE: 99 F | WEIGHT: 201.94 LBS | HEART RATE: 67 BPM | HEIGHT: 70 IN | OXYGEN SATURATION: 98 % | SYSTOLIC BLOOD PRESSURE: 118 MMHG

## 2020-06-05 DIAGNOSIS — Z98.82 BREAST IMPLANT STATUS: Chronic | ICD-10-CM

## 2020-06-05 DIAGNOSIS — Z98.890 OTHER SPECIFIED POSTPROCEDURAL STATES: Chronic | ICD-10-CM

## 2020-06-05 PROCEDURE — 52351 CYSTOURETERO & OR PYELOSCOPE: CPT

## 2020-06-05 RX ORDER — ACETAMINOPHEN 500 MG
650 TABLET ORAL ONCE
Refills: 0 | Status: DISCONTINUED | OUTPATIENT
Start: 2020-06-05 | End: 2020-06-20

## 2020-06-05 RX ORDER — OXYCODONE AND ACETAMINOPHEN 5; 325 MG/1; MG/1
1 TABLET ORAL EVERY 4 HOURS
Refills: 0 | Status: DISCONTINUED | OUTPATIENT
Start: 2020-06-05 | End: 2020-06-05

## 2020-06-05 RX ORDER — HYDROMORPHONE HYDROCHLORIDE 2 MG/ML
0.5 INJECTION INTRAMUSCULAR; INTRAVENOUS; SUBCUTANEOUS
Refills: 0 | Status: DISCONTINUED | OUTPATIENT
Start: 2020-06-05 | End: 2020-06-05

## 2020-06-05 RX ORDER — SODIUM CHLORIDE 9 MG/ML
1000 INJECTION, SOLUTION INTRAVENOUS
Refills: 0 | Status: DISCONTINUED | OUTPATIENT
Start: 2020-06-05 | End: 2020-06-20

## 2020-06-05 RX ADMIN — OXYCODONE AND ACETAMINOPHEN 1 TABLET(S): 5; 325 TABLET ORAL at 08:42

## 2020-06-05 NOTE — BRIEF OPERATIVE NOTE - OPERATION/FINDINGS
sp left ureteroscopy, flexible and rigid ureteroscopy demonstrates no stone, wide open ureter without any obstruction or stone. no ureteral stent.

## 2020-06-05 NOTE — ASU DISCHARGE PLAN (ADULT/PEDIATRIC) - ASU DC SPECIAL INSTRUCTIONSFT
You can take ibuprofen (advil/motrin) and add tylenol as needed for pain control.   's office will call you for a follow up appointment.

## 2020-06-05 NOTE — CHART NOTE - NSCHARTNOTEFT_GEN_A_CORE
PACU ANESTHESIA ADMISSION NOTE      Procedure: Ureteroscopy    Post op diagnosis:      ____  Intubated  TV:______       Rate: ______      FiO2: ______    __x__  Patent Airway    __x__  Full return of protective reflexes    __x__  Full recovery from anesthesia / back to baseline status    Vital:  HR: 73  BP: 125/63  RR: 20  O2 Sat: 96  Temp: 98.3    Mental Status:  ___x_ Awake   _x____ Alert   _____ Drowsy   _____ Sedated    Nausea/Vomiting:  _x__ NO  ______Yes,   See Post - Op Orders          Pain Scale (0-10):  __0___    Treatment: ____ None    ____ See Post - Op/PCA Orders    Post - Operative Fluids:   ____ Oral   ____ See Post - Op Orders    Plan: Discharge:   ___x_Home       _____Floor     _____Critical Care   Other:_________________    Comments: Patient had smooth intraoperative event, no anesthesia complication.

## 2020-06-08 PROBLEM — J45.909 UNSPECIFIED ASTHMA, UNCOMPLICATED: Chronic | Status: ACTIVE | Noted: 2020-05-29

## 2020-06-09 DIAGNOSIS — N20.1 CALCULUS OF URETER: ICD-10-CM

## 2020-06-09 DIAGNOSIS — F17.210 NICOTINE DEPENDENCE, CIGARETTES, UNCOMPLICATED: ICD-10-CM

## 2020-06-09 DIAGNOSIS — J45.909 UNSPECIFIED ASTHMA, UNCOMPLICATED: ICD-10-CM

## 2020-06-30 ENCOUNTER — APPOINTMENT (OUTPATIENT)
Dept: UROLOGY | Facility: CLINIC | Age: 30
End: 2020-06-30
Payer: COMMERCIAL

## 2020-06-30 DIAGNOSIS — N20.0 CALCULUS OF KIDNEY: ICD-10-CM

## 2020-06-30 DIAGNOSIS — N13.30 UNSPECIFIED HYDRONEPHROSIS: ICD-10-CM

## 2020-06-30 DIAGNOSIS — N20.1 CALCULUS OF URETER: ICD-10-CM

## 2020-06-30 PROCEDURE — 99214 OFFICE O/P EST MOD 30 MIN: CPT | Mod: 95

## 2020-06-30 NOTE — ASSESSMENT
[FreeTextEntry1] : SAWYER UNDERWOOD is a 30 year old female who presents for consultation for ureteral stone sp left ureteroscopy, flexible and rigid ureteroscopy demonstrates no stone, wide open ureter without any obstruction or stone. no ureteral stent 6/5/20.\par \par Doing well.\par Fu 1 year sono prior

## 2020-06-30 NOTE — HISTORY OF PRESENT ILLNESS
[Medical Office: ([facility])___] : at the medical office located in  [Other Location: e.g. School (Enter Location, City,State)___] : at [unfilled], at the time of the visit. [Verbal consent obtained from patient] : the patient, [unfilled] [FreeTextEntry1] : SAWYER UNDERWOOD is a 30 year old female who presents for consultation for ureteral stone sp left ureteroscopy, flexible and rigid ureteroscopy demonstrates no stone, wide open ureter without any obstruction or stone. no ureteral stent 6/5/20.\par \par States she is doing well denies any flank pain.  Denies hematuria or dysuria. This is the first episode obstructing ureteral stone however patient does know of a nonobstructing stone on prior imaging\par Denies gross hematuria, dysuria or associated symptoms. \par \par CT abdomen pelvis images reviewed again March 2020 and there were no bilateral stones in the kidneys, only the ureteral stone w hydro from the ureteral stone.\par RBUS may 2020 demonstrating 4 mm likely ureteral stone echogenic focus with posterior shadowing\par \par Denies  PMH including previous kidney stones, recurrent UTIs. \par Family History: No  malignancies\par Social History: paraprofessional \par \par Cr 1.1 3/2020\par UCx contam\par

## 2020-06-30 NOTE — PHYSICAL EXAM
[General Appearance - Well Developed] : well developed [General Appearance - Well Nourished] : well nourished [Well Groomed] : well groomed [Normal Appearance] : normal appearance [General Appearance - In No Acute Distress] : no acute distress [Edema] : no peripheral edema [] : no respiratory distress [Oriented To Time, Place, And Person] : oriented to person, place, and time [Exaggerated Use Of Accessory Muscles For Inspiration] : no accessory muscle use [Respiration, Rhythm And Depth] : normal respiratory rhythm and effort [Not Anxious] : not anxious [Affect] : the affect was normal [Mood] : the mood was normal [Normal Station and Gait] : the gait and station were normal for the patient's age

## 2020-08-12 ENCOUNTER — APPOINTMENT (OUTPATIENT)
Dept: GASTROENTEROLOGY | Facility: CLINIC | Age: 30
End: 2020-08-12

## 2020-10-21 ENCOUNTER — APPOINTMENT (OUTPATIENT)
Dept: GASTROENTEROLOGY | Facility: CLINIC | Age: 30
End: 2020-10-21

## 2020-10-21 NOTE — ED ADULT NURSE NOTE - OBJECTIVE STATEMENT
Patient information on fall and injury prevention
Patient c/o lower abdomen and suprapubic area pain, nausea, dizziness, SOB since this morning. Patient states she delivered vaginally 3 weeks ago, denies chest pain, denies bleeding, denies any urinary symptoms.

## 2021-03-11 ENCOUNTER — OUTPATIENT (OUTPATIENT)
Dept: OUTPATIENT SERVICES | Facility: HOSPITAL | Age: 31
LOS: 1 days | Discharge: HOME | End: 2021-03-11
Payer: COMMERCIAL

## 2021-03-11 DIAGNOSIS — N64.59 OTHER SIGNS AND SYMPTOMS IN BREAST: ICD-10-CM

## 2021-03-11 DIAGNOSIS — N64.4 MASTODYNIA: ICD-10-CM

## 2021-03-11 DIAGNOSIS — Z98.82 BREAST IMPLANT STATUS: Chronic | ICD-10-CM

## 2021-03-11 DIAGNOSIS — Z98.890 OTHER SPECIFIED POSTPROCEDURAL STATES: Chronic | ICD-10-CM

## 2021-03-11 PROCEDURE — G0279: CPT | Mod: 26

## 2021-03-11 PROCEDURE — 77066 DX MAMMO INCL CAD BI: CPT | Mod: 26

## 2021-03-11 PROCEDURE — 76642 ULTRASOUND BREAST LIMITED: CPT | Mod: 26,RT

## 2021-03-28 NOTE — ASU PREOP CHECKLIST - NS PREOP CHK MONITOR ANESTHESIA CONSENT
Goals:  1) Follow bariatric regular diet.   2) Consume 60 grams of protein/day.  3) Sip on 48-64 oz of fluids/day- between meals only.  4) Eat slowly (>20 min/meal), chewing foods well (to applesauce-like consistency).  5) Limit portions to 1/2-1 cup/meal.  6) Take the following supplements:    Multivitamin/minerals: adult dose 2 times daily    Iron: 45-60 mg elemental (18-36 mg if low risk) - may partly or fully be covered in multivitamin     Calcium Citrate containing vitamin D: 500 mg 3 times daily or 600 mg 2 times daily    Vitamin B12: sublingual form of at least 500 mcg daily or injection of 1000 mcg monthly     B-50 Complex once daily   7) Increase activity as able  8) Reduce snacking. Try using high protein snack (hard boiled egg, non-fat yogurt) or non-starchy vegetable instead.     
done

## 2022-01-05 ENCOUNTER — EMERGENCY (EMERGENCY)
Facility: HOSPITAL | Age: 32
LOS: 0 days | Discharge: HOME | End: 2022-01-05
Attending: EMERGENCY MEDICINE | Admitting: EMERGENCY MEDICINE
Payer: COMMERCIAL

## 2022-01-05 VITALS
OXYGEN SATURATION: 95 % | SYSTOLIC BLOOD PRESSURE: 116 MMHG | DIASTOLIC BLOOD PRESSURE: 67 MMHG | WEIGHT: 216.05 LBS | HEART RATE: 82 BPM | RESPIRATION RATE: 16 BRPM | HEIGHT: 70 IN | TEMPERATURE: 98 F

## 2022-01-05 VITALS — DIASTOLIC BLOOD PRESSURE: 84 MMHG | SYSTOLIC BLOOD PRESSURE: 118 MMHG | HEART RATE: 77 BPM

## 2022-01-05 DIAGNOSIS — M54.50 LOW BACK PAIN, UNSPECIFIED: ICD-10-CM
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
## 2022-01-05 DIAGNOSIS — Z98.890 OTHER SPECIFIED POSTPROCEDURAL STATES: Chronic | ICD-10-CM

## 2022-01-05 DIAGNOSIS — Z98.82 BREAST IMPLANT STATUS: Chronic | ICD-10-CM

## 2022-01-05 DIAGNOSIS — J45.909 UNSPECIFIED ASTHMA, UNCOMPLICATED: ICD-10-CM

## 2022-01-05 PROCEDURE — 99284 EMERGENCY DEPT VISIT MOD MDM: CPT

## 2022-01-05 PROCEDURE — 72100 X-RAY EXAM L-S SPINE 2/3 VWS: CPT | Mod: 26

## 2022-01-05 RX ORDER — METHOCARBAMOL 500 MG/1
1000 TABLET, FILM COATED ORAL ONCE
Refills: 0 | Status: COMPLETED | OUTPATIENT
Start: 2022-01-05 | End: 2022-01-05

## 2022-01-05 RX ORDER — METHOCARBAMOL 500 MG/1
2 TABLET, FILM COATED ORAL
Qty: 30 | Refills: 0
Start: 2022-01-05 | End: 2022-01-09

## 2022-01-05 RX ORDER — KETOROLAC TROMETHAMINE 30 MG/ML
30 SYRINGE (ML) INJECTION ONCE
Refills: 0 | Status: DISCONTINUED | OUTPATIENT
Start: 2022-01-05 | End: 2022-01-05

## 2022-01-05 RX ADMIN — METHOCARBAMOL 1000 MILLIGRAM(S): 500 TABLET, FILM COATED ORAL at 08:40

## 2022-01-05 RX ADMIN — Medication 30 MILLIGRAM(S): at 08:40
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
## 2022-01-05 NOTE — ED PROVIDER NOTE - PATIENT PORTAL LINK FT
You can access the FollowMyHealth Patient Portal offered by [facility] by registering at the following website: [email or web address]. By joining [employer]’s FollowMyHealth portal, you will also be able to view your health information using other applications (apps) compatible with our system.

## 2022-01-05 NOTE — ED PROVIDER NOTE - NS ED ROS FT
CONST: No fever, chills or bodyaches  EYES: No pain, redness, drainage or visual changes.  ENT: No ear pain or discharge, nasal discharge or congestion. No sore throat  CARD: No chest pain, palpitations  RESP: No SOB, cough,  GI: No abdominal pain, N/V/D  MS: + back pain.   SKIN: No rashes  NEURO: No headache, dizziness, paresthesias or LOC
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
## 2022-01-05 NOTE — ED PROVIDER NOTE - PHYSICAL EXAMINATION
CONST: Well appearing in NAD  EYES: PERRL, EOMI, Sclera and conjunctiva clear.  NECK: Non-tender, no meningeal signs, supple, no midline tenderness  CARD: Normal S1 S2; Normal rate and rhythm  RESP: Equal BS B/L, No wheezes, rhonchi or rales. No distress  GI: Soft, non-tender, non-distended.  MS: Normal ROM in all extremities. + TTP bilateral lumbar paravertebral regions, no midline tenderness, no seatbelt sign, no ttp extremities,  No edema of lower extremities, no calf pain, radial pulses 2+ bilaterally  SKIN: Warm, dry, no acute rashes. Good turgor  NEURO: A&Ox3, No focal deficits. Strength 5/5 with no sensory deficits. Steady gait

## 2022-01-05 NOTE — ED PROVIDER NOTE - CARE PROVIDER_API CALL
Rj Daniels)  Orthopaedic Surgery  [address] [place] [address]  Phone: [phone]  Fax: [phone]  Follow Up Time: 1-3 Days
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
## 2022-03-13 ENCOUNTER — EMERGENCY (EMERGENCY)
Facility: HOSPITAL | Age: 32
LOS: 0 days | Discharge: HOME | End: 2022-03-13
Attending: EMERGENCY MEDICINE | Admitting: EMERGENCY MEDICINE
Payer: COMMERCIAL

## 2022-03-13 VITALS
RESPIRATION RATE: 18 BRPM | HEIGHT: 70 IN | DIASTOLIC BLOOD PRESSURE: 79 MMHG | OXYGEN SATURATION: 100 % | SYSTOLIC BLOOD PRESSURE: 118 MMHG | HEART RATE: 69 BPM | TEMPERATURE: 97 F

## 2022-03-13 DIAGNOSIS — R10.13 EPIGASTRIC PAIN: ICD-10-CM

## 2022-03-13 DIAGNOSIS — Z98.82 BREAST IMPLANT STATUS: Chronic | ICD-10-CM

## 2022-03-13 DIAGNOSIS — J45.909 UNSPECIFIED ASTHMA, UNCOMPLICATED: ICD-10-CM

## 2022-03-13 DIAGNOSIS — Z98.890 OTHER SPECIFIED POSTPROCEDURAL STATES: Chronic | ICD-10-CM

## 2022-03-13 DIAGNOSIS — R07.9 CHEST PAIN, UNSPECIFIED: ICD-10-CM

## 2022-03-13 DIAGNOSIS — R10.11 RIGHT UPPER QUADRANT PAIN: ICD-10-CM

## 2022-03-13 DIAGNOSIS — R07.89 OTHER CHEST PAIN: ICD-10-CM

## 2022-03-13 DIAGNOSIS — R11.2 NAUSEA WITH VOMITING, UNSPECIFIED: ICD-10-CM

## 2022-03-13 LAB
ALBUMIN SERPL ELPH-MCNC: 4.5 G/DL — SIGNIFICANT CHANGE UP (ref 3.5–5.2)
ALP SERPL-CCNC: 67 U/L — SIGNIFICANT CHANGE UP (ref 30–115)
ALT FLD-CCNC: 12 U/L — SIGNIFICANT CHANGE UP (ref 0–41)
ANION GAP SERPL CALC-SCNC: 13 MMOL/L — SIGNIFICANT CHANGE UP (ref 7–14)
APPEARANCE UR: ABNORMAL
AST SERPL-CCNC: 16 U/L — SIGNIFICANT CHANGE UP (ref 0–41)
BACTERIA # UR AUTO: NEGATIVE — SIGNIFICANT CHANGE UP
BASOPHILS # BLD AUTO: 0.02 K/UL — SIGNIFICANT CHANGE UP (ref 0–0.2)
BASOPHILS NFR BLD AUTO: 0.4 % — SIGNIFICANT CHANGE UP (ref 0–1)
BILIRUB SERPL-MCNC: 0.4 MG/DL — SIGNIFICANT CHANGE UP (ref 0.2–1.2)
BILIRUB UR-MCNC: NEGATIVE — SIGNIFICANT CHANGE UP
BUN SERPL-MCNC: 15 MG/DL — SIGNIFICANT CHANGE UP (ref 10–20)
CALCIUM SERPL-MCNC: 9.6 MG/DL — SIGNIFICANT CHANGE UP (ref 8.5–10.1)
CHLORIDE SERPL-SCNC: 101 MMOL/L — SIGNIFICANT CHANGE UP (ref 98–110)
CO2 SERPL-SCNC: 23 MMOL/L — SIGNIFICANT CHANGE UP (ref 17–32)
COLOR SPEC: YELLOW — SIGNIFICANT CHANGE UP
CREAT SERPL-MCNC: 1 MG/DL — SIGNIFICANT CHANGE UP (ref 0.7–1.5)
DIFF PNL FLD: ABNORMAL
EGFR: 76 ML/MIN/1.73M2 — SIGNIFICANT CHANGE UP
EOSINOPHIL # BLD AUTO: 0.09 K/UL — SIGNIFICANT CHANGE UP (ref 0–0.7)
EOSINOPHIL NFR BLD AUTO: 2 % — SIGNIFICANT CHANGE UP (ref 0–8)
EPI CELLS # UR: 15 /HPF — HIGH (ref 0–5)
GLUCOSE SERPL-MCNC: 105 MG/DL — HIGH (ref 70–99)
GLUCOSE UR QL: NEGATIVE — SIGNIFICANT CHANGE UP
HCG SERPL QL: NEGATIVE — SIGNIFICANT CHANGE UP
HCT VFR BLD CALC: 37.9 % — SIGNIFICANT CHANGE UP (ref 37–47)
HGB BLD-MCNC: 13 G/DL — SIGNIFICANT CHANGE UP (ref 12–16)
HYALINE CASTS # UR AUTO: 4 /LPF — SIGNIFICANT CHANGE UP (ref 0–7)
IMM GRANULOCYTES NFR BLD AUTO: 0.4 % — HIGH (ref 0.1–0.3)
KETONES UR-MCNC: SIGNIFICANT CHANGE UP
LACTATE SERPL-SCNC: 1.7 MMOL/L — SIGNIFICANT CHANGE UP (ref 0.7–2)
LEUKOCYTE ESTERASE UR-ACNC: NEGATIVE — SIGNIFICANT CHANGE UP
LIDOCAIN IGE QN: 21 U/L — SIGNIFICANT CHANGE UP (ref 7–60)
LYMPHOCYTES # BLD AUTO: 0.97 K/UL — LOW (ref 1.2–3.4)
LYMPHOCYTES # BLD AUTO: 21.4 % — SIGNIFICANT CHANGE UP (ref 20.5–51.1)
MCHC RBC-ENTMCNC: 30.4 PG — SIGNIFICANT CHANGE UP (ref 27–31)
MCHC RBC-ENTMCNC: 34.3 G/DL — SIGNIFICANT CHANGE UP (ref 32–37)
MCV RBC AUTO: 88.8 FL — SIGNIFICANT CHANGE UP (ref 81–99)
MONOCYTES # BLD AUTO: 0.43 K/UL — SIGNIFICANT CHANGE UP (ref 0.1–0.6)
MONOCYTES NFR BLD AUTO: 9.5 % — HIGH (ref 1.7–9.3)
NEUTROPHILS # BLD AUTO: 3.01 K/UL — SIGNIFICANT CHANGE UP (ref 1.4–6.5)
NEUTROPHILS NFR BLD AUTO: 66.3 % — SIGNIFICANT CHANGE UP (ref 42.2–75.2)
NITRITE UR-MCNC: NEGATIVE — SIGNIFICANT CHANGE UP
NRBC # BLD: 0 /100 WBCS — SIGNIFICANT CHANGE UP (ref 0–0)
PH UR: 8.5 — HIGH (ref 5–8)
PLATELET # BLD AUTO: 239 K/UL — SIGNIFICANT CHANGE UP (ref 130–400)
POTASSIUM SERPL-MCNC: 3.4 MMOL/L — LOW (ref 3.5–5)
POTASSIUM SERPL-SCNC: 3.4 MMOL/L — LOW (ref 3.5–5)
PROT SERPL-MCNC: 7.1 G/DL — SIGNIFICANT CHANGE UP (ref 6–8)
PROT UR-MCNC: ABNORMAL
RBC # BLD: 4.27 M/UL — SIGNIFICANT CHANGE UP (ref 4.2–5.4)
RBC # FLD: 12.7 % — SIGNIFICANT CHANGE UP (ref 11.5–14.5)
RBC CASTS # UR COMP ASSIST: 4 /HPF — SIGNIFICANT CHANGE UP (ref 0–4)
SODIUM SERPL-SCNC: 137 MMOL/L — SIGNIFICANT CHANGE UP (ref 135–146)
SP GR SPEC: 1.03 — SIGNIFICANT CHANGE UP (ref 1.01–1.03)
TROPONIN T SERPL-MCNC: <0.01 NG/ML — SIGNIFICANT CHANGE UP
UROBILINOGEN FLD QL: ABNORMAL
WBC # BLD: 4.54 K/UL — LOW (ref 4.8–10.8)
WBC # FLD AUTO: 4.54 K/UL — LOW (ref 4.8–10.8)
WBC UR QL: 5 /HPF — SIGNIFICANT CHANGE UP (ref 0–5)

## 2022-03-13 PROCEDURE — 99285 EMERGENCY DEPT VISIT HI MDM: CPT

## 2022-03-13 PROCEDURE — 93010 ELECTROCARDIOGRAM REPORT: CPT

## 2022-03-13 PROCEDURE — 76705 ECHO EXAM OF ABDOMEN: CPT | Mod: 26

## 2022-03-13 PROCEDURE — 71046 X-RAY EXAM CHEST 2 VIEWS: CPT | Mod: 26

## 2022-03-13 RX ORDER — ONDANSETRON 8 MG/1
1 TABLET, FILM COATED ORAL
Qty: 6 | Refills: 0
Start: 2022-03-13 | End: 2022-03-14

## 2022-03-13 RX ORDER — SODIUM CHLORIDE 9 MG/ML
1000 INJECTION INTRAMUSCULAR; INTRAVENOUS; SUBCUTANEOUS ONCE
Refills: 0 | Status: COMPLETED | OUTPATIENT
Start: 2022-03-13 | End: 2022-03-13

## 2022-03-13 RX ORDER — ONDANSETRON 8 MG/1
4 TABLET, FILM COATED ORAL ONCE
Refills: 0 | Status: COMPLETED | OUTPATIENT
Start: 2022-03-13 | End: 2022-03-13

## 2022-03-13 RX ORDER — FAMOTIDINE 10 MG/ML
20 INJECTION INTRAVENOUS ONCE
Refills: 0 | Status: COMPLETED | OUTPATIENT
Start: 2022-03-13 | End: 2022-03-13

## 2022-03-13 RX ORDER — ONDANSETRON 8 MG/1
4 TABLET, FILM COATED ORAL ONCE
Refills: 0 | Status: DISCONTINUED | OUTPATIENT
Start: 2022-03-13 | End: 2022-03-13

## 2022-03-13 RX ADMIN — SODIUM CHLORIDE 1000 MILLILITER(S): 9 INJECTION INTRAMUSCULAR; INTRAVENOUS; SUBCUTANEOUS at 15:26

## 2022-03-13 RX ADMIN — ONDANSETRON 4 MILLIGRAM(S): 8 TABLET, FILM COATED ORAL at 15:26

## 2022-03-13 RX ADMIN — FAMOTIDINE 20 MILLIGRAM(S): 10 INJECTION INTRAVENOUS at 15:26

## 2022-03-13 NOTE — ED PROVIDER NOTE - NS ED ROS FT
Review of Systems:  	•	CONSTITUTIONAL - no fever, no diaphoresis, no chills  	•	SKIN - no rash  	•	HEMATOLOGIC - no bleeding, no bruising  	•	EYES - no eye pain, no blurry vision  	•	ENT - no change in hearing, no sore throat, no ear pain or tinnitus  	•	RESPIRATORY - no shortness of breath, no cough  	•	CARDIAC - + chest pain, no palpitations  	•	GI - + abd pain, + nausea, + vomiting, no diarrhea, no constipation  	•	GENITO-URINARY - no discharge, no dysuria; no hematuria, no increased urinary frequency  	•	MUSCULOSKELETAL - no joint paint, no swelling, no redness  	•	NEUROLOGIC - no weakness, no headache, no paresthesias, no LOC  	•	PSYCH - no anxiety, non suicidal, non homicidal, no hallucination, no depression

## 2022-03-13 NOTE — ED ADULT NURSE NOTE - NSIMPLEMENTINTERV_GEN_ALL_ED
Implemented All Universal Safety Interventions:  [place] to call system. Call bell, personal items and telephone within reach. Instruct patient to call for assistance. Room bathroom lighting operational. Non-slip footwear when patient is off stretcher. Physically safe environment: no spills, clutter or unnecessary equipment. Stretcher in lowest position, wheels locked, appropriate side rails in place.
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
## 2022-03-13 NOTE — ED PROVIDER NOTE - NSFOLLOWUPINSTRUCTIONS_ED_ALL_ED_FT

## 2022-03-13 NOTE — ED ADULT NURSE NOTE - OBJECTIVE STATEMENT
patient c/o chest pain that radiates to ruq and around the back starting today. states yesterday she had abdominal pain with n/v/d/fever

## 2022-03-13 NOTE — ED PROVIDER NOTE - PHYSICAL EXAMINATION
CONST: Well appearing in NAD  EYES: PERRL, EOMI, Sclera and conjunctiva clear.   ENT: No nasal discharge. Oropharynx normal appearing  NECK: Non-tender, no meningeal signs. normal ROM. supple   CARD: Normal S1 S2; Normal rate and rhythm  RESP: Equal BS B/L, No wheezes, rhonchi or rales. No distress  GI: Soft, epigastric and right upper abdominal tenderness, non-distended. no cva tenderness. normal BS  MS: Normal ROM in all extremities. No midline spinal tenderness. pulses 2 +. no calf tenderness or swelling  SKIN: Warm, dry, no acute rashes. Good turgor  NEURO: A&Ox3, No focal deficits.

## 2022-03-13 NOTE — ED PROVIDER NOTE - OBJECTIVE STATEMENT
32 year old female with no pmhx presents with right upper abdominal pain, epigastric and chest pain since 1 pm today. Pt admits to sharp, burning pain, radiating to right upper back. Pt admits to n/v yesterday, and chills. + family members with similar symptoms in the house. Pt denies sob, cough, nasal congestion, sore throat, urinary symptoms, calf pain or swelling. non smoker.
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
## 2022-03-13 NOTE — ED PROVIDER NOTE - PATIENT PORTAL LINK FT
You can access the FollowMyHealth Patient Portal offered by [facility] by registering at the following website: [email or web address]. By joining [employer]’s FollowMyHealth portal, you will also be able to view your health information using other applications (apps) compatible with our system.

## 2022-06-13 RX ORDER — KETOROLAC TROMETHAMINE 10 MG/1
10 TABLET, FILM COATED ORAL
Qty: 10 | Refills: 0 | Status: DISCONTINUED | COMMUNITY
Start: 2020-03-28 | End: 2022-06-13

## 2022-06-13 RX ORDER — TAMSULOSIN HYDROCHLORIDE 0.4 MG/1
0.4 CAPSULE ORAL
Qty: 14 | Refills: 0 | Status: DISCONTINUED | COMMUNITY
Start: 2020-03-28 | End: 2022-06-13

## 2022-06-15 ENCOUNTER — APPOINTMENT (OUTPATIENT)
Dept: GASTROENTEROLOGY | Facility: CLINIC | Age: 32
End: 2022-06-15

## 2022-06-22 ENCOUNTER — APPOINTMENT (OUTPATIENT)
Dept: GASTROENTEROLOGY | Facility: CLINIC | Age: 32
End: 2022-06-22

## 2022-09-27 ENCOUNTER — APPOINTMENT (OUTPATIENT)
Dept: SURGERY | Facility: CLINIC | Age: 32
End: 2022-09-27

## 2022-09-27 VITALS — WEIGHT: 208 LBS | BODY MASS INDEX: 29.78 KG/M2 | HEIGHT: 70 IN

## 2022-09-27 PROCEDURE — 99203 OFFICE O/P NEW LOW 30 MIN: CPT

## 2022-09-27 NOTE — PHYSICAL EXAM
[JVD] : no jugular venous distention  [Normal Breath Sounds] : Normal breath sounds [No Rash or Lesion] : No rash or lesion [Alert] : alert [Calm] : calm [de-identified] : Overweight [de-identified] : Normal [de-identified] : Moderately protuberant abdomen, moderate diastases recti [de-identified] : Incarcerated supraumbilical ventral hernia

## 2022-09-27 NOTE — ASSESSMENT
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  She occasionally does moderately heavy lifting and strenuous activity around the house, and they just recently moved.\par \par Physical examination demonstrates a tender large strawberry sized bulge 1 fingerbreadth above the umbilicus which is not reducible consistent with an incarcerated supraumbilical ventral hernia warranting surgical repair.  There is no evidence of strangulation and the patient denies any symptoms of obstruction.  This hernia is complicated by a moderate to large diastases recti which is likely related to her excess abdominal weight and her 2 previous full-term pregnancies in the past.  Her current BMI is 30.\par \par I explained the pros and cons of surgery, as well as all risks, benefits, indications and alternatives of the procedure and the patient understood and agreed.  Twila was scheduled for the repair of her incarcerated supraumbilical ventral hernia with mesh on Wednesday, November 9, 2022 under LOCAL with IV SEDATION at [facility] with presurgical testing waived.  She was encouraged to avoid heavy lifting and strenuous activity in the interim, of course.  We also discussed the importance of calorie restriction and healthy eating with regard to weight loss, hernia recurrence and her overall health.

## 2022-09-27 NOTE — CONSULT LETTER
[Dear  ___] : Dear  [unfilled], [Courtesy Letter:] : I had the pleasure of seeing your patient, [unfilled], in my office today. [Please see my note below.] : Please see my note below. [Consult Closing:] : Thank you very much for allowing me to participate in the care of this patient.  If you have any questions, please do not hesitate to contact me. [FreeTextEntry3] : Respectfully,\par \par Dillan Perry M.D., FACS\par  [DrJohn  ___] : Dr. ALARCON

## 2022-10-04 ENCOUNTER — INPATIENT (INPATIENT)
Facility: HOSPITAL | Age: 32
LOS: 0 days | Discharge: HOME | End: 2022-10-05
Attending: PSYCHIATRY & NEUROLOGY | Admitting: PSYCHIATRY & NEUROLOGY

## 2022-10-04 VITALS
SYSTOLIC BLOOD PRESSURE: 118 MMHG | HEART RATE: 61 BPM | DIASTOLIC BLOOD PRESSURE: 71 MMHG | RESPIRATION RATE: 16 BRPM | OXYGEN SATURATION: 99 %

## 2022-10-04 DIAGNOSIS — Z98.890 OTHER SPECIFIED POSTPROCEDURAL STATES: Chronic | ICD-10-CM

## 2022-10-04 DIAGNOSIS — Z98.82 BREAST IMPLANT STATUS: Chronic | ICD-10-CM

## 2022-10-04 LAB
ALBUMIN SERPL ELPH-MCNC: 4.5 G/DL — SIGNIFICANT CHANGE UP (ref 3.5–5.2)
ALP SERPL-CCNC: 63 U/L — SIGNIFICANT CHANGE UP (ref 30–115)
ALT FLD-CCNC: 16 U/L — SIGNIFICANT CHANGE UP (ref 0–41)
ANION GAP SERPL CALC-SCNC: 11 MMOL/L — SIGNIFICANT CHANGE UP (ref 7–14)
APTT BLD: 32.9 SEC — SIGNIFICANT CHANGE UP (ref 27–39.2)
AST SERPL-CCNC: 17 U/L — SIGNIFICANT CHANGE UP (ref 0–41)
BASOPHILS # BLD AUTO: 0.05 K/UL — SIGNIFICANT CHANGE UP (ref 0–0.2)
BASOPHILS NFR BLD AUTO: 0.7 % — SIGNIFICANT CHANGE UP (ref 0–1)
BILIRUB SERPL-MCNC: 0.3 MG/DL — SIGNIFICANT CHANGE UP (ref 0.2–1.2)
BUN SERPL-MCNC: 13 MG/DL — SIGNIFICANT CHANGE UP (ref 10–20)
CALCIUM SERPL-MCNC: 9.6 MG/DL — SIGNIFICANT CHANGE UP (ref 8.4–10.5)
CHLORIDE SERPL-SCNC: 102 MMOL/L — SIGNIFICANT CHANGE UP (ref 98–110)
CO2 SERPL-SCNC: 26 MMOL/L — SIGNIFICANT CHANGE UP (ref 17–32)
CREAT SERPL-MCNC: 0.8 MG/DL — SIGNIFICANT CHANGE UP (ref 0.7–1.5)
EGFR: 99 ML/MIN/1.73M2 — SIGNIFICANT CHANGE UP
EOSINOPHIL # BLD AUTO: 0.16 K/UL — SIGNIFICANT CHANGE UP (ref 0–0.7)
EOSINOPHIL NFR BLD AUTO: 2.3 % — SIGNIFICANT CHANGE UP (ref 0–8)
GLUCOSE SERPL-MCNC: 88 MG/DL — SIGNIFICANT CHANGE UP (ref 70–99)
HCG SERPL QL: NEGATIVE — SIGNIFICANT CHANGE UP
HCT VFR BLD CALC: 35.7 % — LOW (ref 37–47)
HGB BLD-MCNC: 12.7 G/DL — SIGNIFICANT CHANGE UP (ref 12–16)
IMM GRANULOCYTES NFR BLD AUTO: 0.4 % — HIGH (ref 0.1–0.3)
INR BLD: 1.11 RATIO — SIGNIFICANT CHANGE UP (ref 0.65–1.3)
LYMPHOCYTES # BLD AUTO: 2.57 K/UL — SIGNIFICANT CHANGE UP (ref 1.2–3.4)
LYMPHOCYTES # BLD AUTO: 36.6 % — SIGNIFICANT CHANGE UP (ref 20.5–51.1)
MCHC RBC-ENTMCNC: 31.6 PG — HIGH (ref 27–31)
MCHC RBC-ENTMCNC: 35.6 G/DL — SIGNIFICANT CHANGE UP (ref 32–37)
MCV RBC AUTO: 88.8 FL — SIGNIFICANT CHANGE UP (ref 81–99)
MONOCYTES # BLD AUTO: 0.58 K/UL — SIGNIFICANT CHANGE UP (ref 0.1–0.6)
MONOCYTES NFR BLD AUTO: 8.3 % — SIGNIFICANT CHANGE UP (ref 1.7–9.3)
NEUTROPHILS # BLD AUTO: 3.64 K/UL — SIGNIFICANT CHANGE UP (ref 1.4–6.5)
NEUTROPHILS NFR BLD AUTO: 51.7 % — SIGNIFICANT CHANGE UP (ref 42.2–75.2)
NRBC # BLD: 0 /100 WBCS — SIGNIFICANT CHANGE UP (ref 0–0)
PLATELET # BLD AUTO: 249 K/UL — SIGNIFICANT CHANGE UP (ref 130–400)
POTASSIUM SERPL-MCNC: 4.1 MMOL/L — SIGNIFICANT CHANGE UP (ref 3.5–5)
POTASSIUM SERPL-SCNC: 4.1 MMOL/L — SIGNIFICANT CHANGE UP (ref 3.5–5)
PROT SERPL-MCNC: 6.9 G/DL — SIGNIFICANT CHANGE UP (ref 6–8)
PROTHROM AB SERPL-ACNC: 12.7 SEC — SIGNIFICANT CHANGE UP (ref 9.95–12.87)
RBC # BLD: 4.02 M/UL — LOW (ref 4.2–5.4)
RBC # FLD: 13 % — SIGNIFICANT CHANGE UP (ref 11.5–14.5)
SARS-COV-2 RNA SPEC QL NAA+PROBE: SIGNIFICANT CHANGE UP
SODIUM SERPL-SCNC: 139 MMOL/L — SIGNIFICANT CHANGE UP (ref 135–146)
TROPONIN T SERPL-MCNC: <0.01 NG/ML — SIGNIFICANT CHANGE UP
WBC # BLD: 7.03 K/UL — SIGNIFICANT CHANGE UP (ref 4.8–10.8)
WBC # FLD AUTO: 7.03 K/UL — SIGNIFICANT CHANGE UP (ref 4.8–10.8)

## 2022-10-04 PROCEDURE — 0042T: CPT | Mod: MA

## 2022-10-04 PROCEDURE — 70496 CT ANGIOGRAPHY HEAD: CPT | Mod: 26,MA

## 2022-10-04 PROCEDURE — 70498 CT ANGIOGRAPHY NECK: CPT | Mod: 26,MA

## 2022-10-04 PROCEDURE — 99222 1ST HOSP IP/OBS MODERATE 55: CPT

## 2022-10-04 PROCEDURE — 70450 CT HEAD/BRAIN W/O DYE: CPT | Mod: 26,59,76

## 2022-10-04 PROCEDURE — 99291 CRITICAL CARE FIRST HOUR: CPT

## 2022-10-04 PROCEDURE — 93010 ELECTROCARDIOGRAM REPORT: CPT

## 2022-10-04 RX ORDER — SODIUM CHLORIDE 9 MG/ML
1000 INJECTION INTRAMUSCULAR; INTRAVENOUS; SUBCUTANEOUS
Refills: 0 | Status: DISCONTINUED | OUTPATIENT
Start: 2022-10-04 | End: 2022-10-05

## 2022-10-04 RX ORDER — ATORVASTATIN CALCIUM 80 MG/1
80 TABLET, FILM COATED ORAL AT BEDTIME
Refills: 0 | Status: DISCONTINUED | OUTPATIENT
Start: 2022-10-04 | End: 2022-10-05

## 2022-10-04 RX ORDER — SODIUM CHLORIDE 9 MG/ML
500 INJECTION INTRAMUSCULAR; INTRAVENOUS; SUBCUTANEOUS ONCE
Refills: 0 | Status: COMPLETED | OUTPATIENT
Start: 2022-10-04 | End: 2022-10-04

## 2022-10-04 RX ORDER — ALTEPLASE 100 MG
81 KIT INTRAVENOUS ONCE
Refills: 0 | Status: COMPLETED | OUTPATIENT
Start: 2022-10-04 | End: 2022-10-04

## 2022-10-04 RX ORDER — ALTEPLASE 100 MG
9 KIT INTRAVENOUS ONCE
Refills: 0 | Status: COMPLETED | OUTPATIENT
Start: 2022-10-04 | End: 2022-10-04

## 2022-10-04 RX ADMIN — SODIUM CHLORIDE 500 MILLILITER(S): 9 INJECTION INTRAMUSCULAR; INTRAVENOUS; SUBCUTANEOUS at 20:16

## 2022-10-04 RX ADMIN — ALTEPLASE 81 MILLIGRAM(S): KIT at 14:48

## 2022-10-04 RX ADMIN — ALTEPLASE 540 MILLIGRAM(S): KIT at 14:48

## 2022-10-04 NOTE — CONSULT NOTE ADULT - SUBJECTIVE AND OBJECTIVE BOX
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 with PMHx of obesity and headaches (likely migraine without aura, never diagnosed by a doctor); FHx of mother with AMI (when 51 yo) with no other neurological disease is brought to the ED presenting on 10/4/2022 at 10:30 am when she was at her work, writing with a pen, she presented with a sudden onset of symptoms of right brachial sided weakness and pat-brachial sensory loss that have not resolve. Pt presented with holocranial headache a hour after started with her deficits with sonophobia and photophobia. In the hospital presenting with nausea. Pt reports similar episodes of headache that never associated visual, sensory or language deficits. No previous fever. No previous neurological symptoms.     In ED, persisting neurological symptoms without resolution:  - Vitals: T 98 HR 61 /71 RR 18 Sat 98% on RA  - Labs: Gluc 88   - EKG: SR 60 bpm  - CT stroke protocol: No CT evidence of large acute territorial infarct.No evidence of acute intracranial pathology. No mass effect, midline   shift or intracranial hemorrhage.     - S/p tPA at 14:48.  - Neuro checks and vitals every 15min for 2 hours, then every 30min for 6 hours, then every 1 hour for 16 hours  - BP goal < 180/105 for 24hrs post-tPA  - Labetalol 10mg for elevated BP, repeat every 10min until at BP goal  - Repeat CTH 24hr after tPA  - Minimize arterial and venous punctures, able to draw blood 4hrs post-tPA  - Maintain strict bed rest for 12 hours with HOB <30 degrees  - After 12hr post-tpa, patient able to ambulate with assist  - NPO for 12hrs post-tPA  - No antiplatelet, anticoagulation, and heparin sq until 24 hour repeat CT head negative for bleed  - MRI brain without contrast   - Echo with bubble study      PAST MEDICAL & SURGICAL HISTORY:  Ovarian cyst      Miscarriage      Asthma  rare epsisodes- last use of albuterol 5yrs ago      H/O endoscopy      H/O breast augmentation          Hospital Course:    TODAY'S SUBJECTIVE & REVIEW OF SYMPTOMS:     Constitutional WNL   Cardio WNL   Resp WNL   GI WNL  Heme WNL  Endo WNL  Skin WNL  MSK right arm weakness and numbness  Neuro WNL  Cognitive WNL  Psych WNL      MEDICATIONS  (STANDING):  atorvastatin 80 milliGRAM(s) Oral at bedtime    MEDICATIONS  (PRN):      FAMILY HISTORY:      Allergies    No Known Allergies    Intolerances        SOCIAL HISTORY:    [  ] Etoh  [  ] Smoking  [  ] Substance abuse     Home Environment:  [   ] Home Alone  [ x  ] Lives with Family  [   ] Home Health Aid    Dwelling:  [   ] Apartment  [ x  ] Private House  [   ] Adult Home  [   ] Skilled Nursing Facility      [   ] Short Term  [   ] Long Term  [ x  ] Stairs       Elevator [   ]    FUNCTIONAL STATUS PTA: (Check all that apply)  Ambulation: [  x  ]Independent    [   ] Dependent     [   ] Non-Ambulatory  Assistive Device: [   ] SA Cane  [   ]  Q Cane  [   ] Walker  [   ]  Wheelchair  ADL : [  x ] Independent  [    ]  Dependent       Vital Signs Last 24 Hrs  T(C): 36.7 (04 Oct 2022 14:03), Max: 36.7 (04 Oct 2022 14:03)  T(F): 98 (04 Oct 2022 14:03), Max: 98 (04 Oct 2022 14:03)  HR: 76 (04 Oct 2022 16:33) (58 - 76)  BP: 121/86 (04 Oct 2022 16:33) (112/84 - 133/87)  BP(mean): --  RR: 16 (04 Oct 2022 16:33) (16 - 23)  SpO2: 98% (04 Oct 2022 16:33) (95% - 100%)    Parameters below as of 04 Oct 2022 16:33  Patient On (Oxygen Delivery Method): room air          PHYSICAL EXAM: Awake & Alert  GENERAL: NAD  HEAD:  Normocephalic  CHEST/LUNG: Clear   HEART: S1S2+  ABDOMEN: Soft, Nontender  EXTREMITIES:  no calf tenderness    NERVOUS SYSTEM:  Cranial Nerves 2-12 intact [   ] Abnormal  [   ]no dysarthria or facial droop  ROM: WFL all extremities [ x  ]  Abnormal [   ]  Motor Strength: WFL all extremities  [   ]  Abnormal [x   ]4+/5 RUE / 5/5 silvia LE and LUE  Sensation: intact to light touch [   ] Abnormal [ x  ]decreased light touch RUE    FUNCTIONAL STATUS:  Bed Mobility: Independent [   ]  Supervision [  x ]  Needs Assistance [   ]  N/A [   ]  Transfers: Independent [   ]  Supervision [   ]  Needs Assistance [   ]  N/A [   ]   Ambulation: Independent [   ]  Supervision [   ]  Needs Assistance [   ]  N/A [   ]  ADL: Independent [   ] Requires Assistance [   ] N/A [   ]      LABS:                        12.7   7.03  )-----------( 249      ( 04 Oct 2022 14:35 )             35.7     10-04    139  |  102  |  13  ----------------------------<  88  4.1   |  26  |  0.8    Ca    9.6      04 Oct 2022 14:35    TPro  6.9  /  Alb  4.5  /  TBili  0.3  /  DBili  x   /  AST  17  /  ALT  16  /  AlkPhos  63  10-04    PT/INR - ( 04 Oct 2022 14:35 )   PT: 12.70 sec;   INR: 1.11 ratio         PTT - ( 04 Oct 2022 14:35 )  PTT:32.9 sec      RADIOLOGY & ADDITIONAL STUDIES:

## 2022-10-04 NOTE — ED PROVIDER NOTE - CHILD ABUSE FACILITY
Chief Complaint   Patient presents with   • Paperwork   • Medication Refill     Metformin       HISTORY OF PRESENT ILLNESS: Patient is a 22 y.o. female, established patient who presents today to discuss medical problems as listed below:    Health Maintenance:  COMPLETED     Chronic fatigue syndrome  New today.  This is a chronic symptom for patient.  This was initially diagnosed by pediatrician.    Obstructive sleep apnea syndrome  This is a chronic problem.  Has been evaluated and diagnosed with sleep apnea by pulmonology a few months ago.  Patient was seen by Dr. Radha Martinez.  Pending CPAP machine.  Patient has not been contacted since.    Insulin resistance  Chronic problem.  Patient has been followed by endocrinology, Dr. Wilman Cm.  Next appointment is in January.  Patient needs refill on Metformin today.  She is on Metformin ER 1000 mg twice daily.  Denies hypoglycemic episodes.  Last A1c is 5.5 from March 2021.    Administrative encounter  Patient is requesting FMLA accommodation paperwork filled out today.      Patient Active Problem List    Diagnosis Date Noted   • Chronic fatigue syndrome 08/17/2021   • Intractable migraine with aura without status migrainosus 08/10/2021   • Obstructive sleep apnea syndrome 08/10/2021   • Vestibular migraine 08/10/2021   • Otalgia of left ear 04/27/2021   • History of canker sores 04/27/2021   • Dysthymia 04/07/2021   • Polyarthralgia 03/25/2021   • Brittle nails 03/25/2021   • Cervical lymphadenopathy 12/29/2020   • Subclinical hypothyroidism 11/23/2020   • Insulin resistance 11/23/2020   • Anemia 11/23/2020   • Morbid obesity (HCC) 10/07/2020   • Skin rash 09/15/2020   • Administrative encounter 09/15/2020   • Hospital discharge follow-up 09/02/2020   • Rash of hands 08/12/2020   • Snoring 08/12/2020   • Vertigo 03/03/2020   • PCOS (polycystic ovarian syndrome) 11/19/2019   • Vitamin D deficiency 11/19/2019   • High triglycerides 11/19/2019   • Anxiety and  depression 11/19/2019        Allergies: Gluten meal    Current Outpatient Medications   Medication Sig Dispense Refill   • rizatriptan (MAXALT) 10 MG tablet      • Magnesium 500 MG Tab 1 tablet with a meal Orally Once a day for 30 day(s)     • Norgestim-Eth Estrad Triphasic (TRI-LO-ESTARYLLA) 0.18/0.215/0.25 MG-25 MCG Tab Take 1 Tablet by mouth every day.     • Selenium 200 MCG Cap Take  by mouth.     • metFORMIN ER (GLUCOPHAGE XR) 500 MG TABLET SR 24 HR Take 2 Tablets by mouth 2 times a day. 360 Tablet 2   • B Complex Vitamins (VITAMIN-B COMPLEX PO) Take  by mouth.     • citalopram (CELEXA) 20 MG Tab Take 1 tablet by mouth every day. 180 tablet 1   • tacrolimus (PROTOPIC) 0.1 % Ointment Apply 1 Application topically 2 times a day. To affected area on hands and arms 60 g 2   • tretinoin (RETIN-A) 0.025 % gel Apply 1 Application topically every bedtime. 45 g 2   • Clindamycin Phos-Benzoyl Perox (ONEXTON) 1.2-3.75 % Gel Apply 1 Application topically every day. 50 g 3   • VITAMIN D PO Take 6,000 Units by mouth every day.     • diazePAM (VALIUM) 5 MG Tab TAKE 1 TABLET BY MOUTH TWICE A DAY AS NEEDED FOR VERTIGO FOR 30 DAYS     • albuterol 108 (90 Base) MCG/ACT Aero Soln inhalation aerosol Inhale 2 Puffs by mouth every four hours as needed for Shortness of Breath. 1 Inhaler 0     No current facility-administered medications for this visit.       Social History     Tobacco Use   • Smoking status: Never Smoker   • Smokeless tobacco: Never Used   Vaping Use   • Vaping Use: Never used   Substance Use Topics   • Alcohol use: Yes     Comment: occasionally   • Drug use: No     Social History     Social History Narrative   • Not on file       Family History   Problem Relation Age of Onset   • Thyroid Mother    • Depression Father    • Alcohol abuse Father    • No Known Problems Maternal Grandmother    • Heart Attack Maternal Grandfather 66   • Cancer Paternal Grandmother         Breast , esophageal   • Stroke Paternal Grandmother  "66   • Alcohol abuse Paternal Grandfather    • Lung Disease Paternal Grandfather    • Sleep Apnea Brother        Allergies, past medical history, past surgical history, family history, social history reviewed and updated.    Review of Systems:     - Constitutional: Negative for fever, chills, unexpected weight change, and fatigue/generalized weakness.     - Respiratory: Negative for cough, sputum production, chest congestion, dyspnea, wheezing, and crackles.      - Cardiovascular: Negative for chest pain, palpitations, orthopnea, and bilateral lower extremity edema.     - Psychiatric/Behavioral: Negative for depression, suicidal/homicidal ideation and memory loss.      All other systems reviewed and are negative    Exam:    /86   Pulse 96   Temp 36.6 °C (97.8 °F) (Temporal)   Resp 12   Ht 1.702 m (5' 7\")   Wt 115 kg (254 lb)   LMP 08/16/2021   SpO2 97%   BMI 39.78 kg/m²  Body mass index is 39.78 kg/m².    Physical Exam:  Constitutional: Well-developed and well-nourished. Not diaphoretic. No distress.   Cardiovascular: Regular rate and rhythm, S1 and S2 without murmur, rubs, or gallops.    Chest: Effort normal. Clear to auscultation throughout. No adventitious sounds.   Neurological: Alert and oriented x 3.   Psychiatric:  Behavior, mood, and affect are appropriate.  MA/nursing note and vitals reviewed.    LABS:3/2021   results reviewed and discussed with the patient, questions answered.    My total time spent caring for the patient on the day of the encounter was 15 minutes.   This does not include time spent on separately billable procedures/tests.     Assessment/Plan:  1. Chronic fatigue syndrome  Uncontrolled stable.  We will follow-up with CPAP machine and see if symptoms improve when sleep apnea is under control.  Advise supportive care at this time.    2. Obstructive sleep apnea syndrome  Patient to contact pulmonology for CPAP device    3. Insulin resistance  Stable on current regimen.  Patient " [facility] is followed by endocrinology.  Next appointment with endocrinology in January 2022.  Refills given today.  - metFORMIN ER (GLUCOPHAGE XR) 500 MG TABLET SR 24 HR; Take 2 Tablets by mouth 2 times a day.  Dispense: 360 Tablet; Refill: 2    4. Administrative encounter  Paperwork filled out and discussed with patient during the appointment.  Paperwork scanned into the chart and given to patient.       Discussed with patient possible alternative diagnoses, pt is to take all medications as prescribed. If symptoms persist FU w/PCP, if symptoms worsen go to emergency room. If experiencing any side effects from prescribed medications reports to the office immediately or go to emergency room.  Reviewed indication, dosage, usage and potential adverse effects of prescribed medications. Reviewed risks and benefits of treatment plan. Patient verbalizes understanding of all instruction and verbally agrees to plan.    No follow-ups on file. annual

## 2022-10-04 NOTE — CONSULT NOTE ADULT - ASSESSMENT
IMPRESSION: Rehab of r/o CVA    PRECAUTIONS: [   ] Cardiac  [   ] Respiratory  [   ] Seizures [   ] Contact Isolation  [   ] Droplet Isolation  [   ] Other    Weight Bearing Status:     RECOMMENDATION:    Out of Bed to Chair     DVT/Decubiti Prophylaxis    REHAB PLAN:     [ x   ] Bedside P/T 3-5 times a week   [  x  ]   Bedside O/T  2-3 times a week             [  x  ] Speech Therapy               [    ]  No Rehab Therapy Indicated   Conditioning/ROM                                    ADL  Bed Mobility                                               Conditioning/ROM  Transfers                                                     Bed Mobility  Sitting /Standing Balance                         Transfers                                        Gait Training                                               Sitting/Standing Balance  Stair Training [   ]Applicable                    Home equipment Eval                                                                        Splinting  [   ] Only      GOALS:   ADL   [  x  ]   Independent                    Transfers  [  x  ] Independent                          Ambulation  [  x  ] Independent     [  x   ] With device                            [    ]  CG                                                         [    ]  CG                                                                  [    ] CG                            [    ] Min A                                                   [    ] Min A                                                              [    ] Min  A          DISCHARGE PLAN:   [    ]  Good candidate for Intensive Rehabilitation/Hospital based                                             Will tolerate 3hrs Intensive Rehab Daily                                       [     ]  Short Term Rehab in Skilled Nursing Facility                                       [  x   ]  Home with Outpatient or  services                                         [     ]  Possible Candidate for Intensive Hospital based Rehab                                       
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 with PMHx of obesity and headaches (likely migraine without aura, never diagnosed by a doctor); FHx of mother with AMI (when 53 yo) with no other neurological disease is brought to the ED presenting on 10/4/2022 at 10:30 am when she was at her work, writing with a pen, she presented with a sudden onset of symptoms of right brachial sided weakness and pat-brachial sensory loss that have not resolve. Pt presented with holocranial headache a hour after started with her deficits with sonophobia and photophobia. In the hospital presenting with nausea. Diferential diagnosis includes: right sensory-motor lacunar syndrome, migraine with aura and focal seizure.    Plan  - Patient to be admitted to Neurology service

## 2022-10-04 NOTE — H&P ADULT - ASSESSMENT
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 with PMHx of obesity and headaches (likely migraine without aura, never diagnosed by a doctor); FHx of mother with AMI (when 55 yo) with no other neurological disease is brought to the ED presenting on 10/4/2022 at 10:30 am when she was at her work, writing with a pen, she presented with a sudden onset of symptoms of right brachial sided weakness and pat-brachial sensory loss that have not resolve. Pt presented with holocranial headache a hour after started with her deficits with sonophobia and photophobia. In the hospital presenting with nausea. Diferential diagnosis includes: right sensory-motor lacunar syndrome, migraine with aura and focal seizure.    #Right pat-brachial sensory-motor lacunar syndrome in relation with acute ischemic stroke  Right brachial sided weakness and pat-brachial sensory loss that have not resolved. On arrival NIHSS = 2 and ASPECTS = 10.   PLAN:   - S/p tPA at 14:48.  - Neuro checks and vitals every 15min for 2 hours, then every 30min for 6 hours, then every 1 hour for 16 hours  - BP goal < 180/105 for 24hrs post-tPA  - Labetalol 10mg for elevated BP, repeat every 10min until at BP goal  - Repeat CTH 24hr after tPA  - Minimize arterial and venous punctures, able to draw blood 4hrs post-tPA  - Maintain strict bed rest for 12 hours with HOB <30 degrees  - After 12hr post-tpa, patient able to ambulate with assist  - NPO for 12hrs post-tPA  - No antiplatelet, anticoagulation, and heparin sq until 24 hour repeat CT head negative for bleed  - MRI brain without contrast   - Echo with bubble study  - PT/OT order  - Swallow evaluation if fails dysphagia screen  - SLP order if patient with dysarthria    #Miscellanea:  Diet: DASH   Electrolytes: replete  DVT prophylaxis: SCD  GI prophylaxis: No  Code: Full  Dispo: Stroke Unit 32y Female with PMHx of 30 yo F 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 with PMHx of obesity and headaches (likely migraine without aura, never diagnosed by a doctor); FHx of mother with AMI (when 55 yo) with no other neurological disease is brought to the ED presenting on 10/4/2022 at 10:30 am when she was at her work, writing with a pen, she presented with a sudden onset of symptoms of right brachial sided weakness and pat-brachial sensory loss that have not resolve. Pt presented with holocranial headache a hour after started with her deficits with sonophobia and photophobia. In the hospital presenting with nausea. Diferential diagnosis includes: right sensory-motor lacunar syndrome, migraine with aura and focal seizure.    #Right pat-brachial sensory-motor lacunar syndrome in relation with acute ischemic stroke  Right brachial sided weakness and pat-brachial sensory loss that have not resolved. On arrival NIHSS = 2 and ASPECTS = 10. 1 hour after rTPA started patient improved to her baseline. NIHSS = 0.  PLAN:   - S/p tPA at 14:48.  - Neuro checks and vitals every 15min for 2 hours, then every 30min for 6 hours, then every 1 hour for 16 hours  - BP goal < 180/105 for 24hrs post-tPA  - Labetalol 10mg for elevated BP, repeat every 10min until at BP goal  - Repeat CTH 24hr after tPA  - Minimize arterial and venous punctures, able to draw blood 4hrs post-tPA  - Maintain strict bed rest for 12 hours with HOB <30 degrees  - After 12hr post-tpa, patient able to ambulate with assist  - NPO for 12hrs post-tPA  - No antiplatelet, anticoagulation, and heparin sq until 24 hour repeat CT head negative for bleed  - MRI brain without contrast   - Echo with bubble study  - PT/OT order  - Swallow evaluation if fails dysphagia screen  - SLP order if patient with dysarthria    #Miscellanea:  Diet: DASH   Electrolytes: replete  DVT prophylaxis: SCD  GI prophylaxis: No  Code: Full  Dispo: Stroke Unit

## 2022-10-04 NOTE — ED PROVIDER NOTE - CLINICAL SUMMARY MEDICAL DECISION MAKING FREE TEXT BOX
32-year-old female no signal past medical history presenting for evaluation of right upper extremity numbness with associated weakness since 10:30 AM today.  Last known well immediately prior. No other acute complaints.  Well appearing, NAD, non toxic. NCAT PERRLA EOMI neck supple non tender normal wob cta bl rrr abdomen s nt nd no rebound no guarding WWPx4 right upper extremity sensory and motor deficit otherwise neuro non focal. Labs EKG imaging reviewed.  Patient was called as a stroke code upon arrival.  Status post neurology evaluation and tPA administration.  Patient with improving symptoms.  Will admit to stroke unit for further evaluation.

## 2022-10-04 NOTE — ED PROVIDER NOTE - ATTENDING CONTRIBUTION TO CARE
32-year-old female no signal past medical history presenting for evaluation of right upper extremity numbness with associated weakness since 10:30 AM today.  Last known well immediately prior. No other acute complaints.  Well appearing, NAD, non toxic. NCAT PERRLA EOMI neck supple non tender normal wob cta bl rrr abdomen s nt nd no rebound no guarding WWPx4 right upper extremity sensory and motor deficit otherwise neuro non focal

## 2022-10-04 NOTE — H&P ADULT - ATTENDING COMMENTS
Patient seen and examined and agree with above except as noted.  Patients history, notes, labs, imaging, vitals and meds reviewed personally.  Patient was writing and right hand became weak  Exam shows right arm weakness and right face and arm numbness  Discussed risk, benefits and alternatives to TPA with patient and her mother present.  TPA dose reviewed with nurse at bedside    Plan as above  DDX: Stroke, migraine, Demyelinating disease

## 2022-10-04 NOTE — ED PROVIDER NOTE - CARE PLAN
1 Principal Discharge DX:	Right arm weakness  Secondary Diagnosis:	Decreased sensation of hand and upper extremity

## 2022-10-04 NOTE — H&P ADULT - HISTORY OF PRESENT ILLNESS
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 with PMHx of obesity and headaches (likely migraine without aura, never diagnosed by a doctor); FHx of mother with AMI (when 55 yo) with no other neurological disease is brought to the ED presenting on 10/4/2022 at 10:30 am when she was at her work, writing with a pen, she presented with a sudden onset of symptoms of right brachial sided weakness and pat-brachial sensory loss that have not resolve. Pt presented with holocranial headache a hour after started with her deficits with sonophobia and photophobia. In the hospital presenting with nausea. Pt reports similar episodes of headache that never associated visual, sensory or language deficits. No previous fever. No previous neurological symptoms.     In ED, persisting neurological symptoms without resolution:  - Vitals: T 98 HR 61 /71 RR 18 Sat 98% on RA  - Labs: Gluc 88   - CT stroke protocol: No CT evidence of large acute territorial infarct.No evidence of acute intracranial pathology. No mass effect, midline   shift or intracranial hemorrhage. 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 with PMHx of obesity and headaches (likely migraine without aura, never diagnosed by a doctor); FHx of mother with AMI (when 53 yo) with no other neurological disease is brought to the ED presenting on 10/4/2022 at 10:30 am when she was at her work, writing with a pen, she presented with a sudden onset of symptoms of right brachial sided weakness and pat-brachial sensory loss that have not resolve. Pt presented with holocranial headache a hour after started with her deficits with sonophobia and photophobia. In the hospital presenting with nausea. Pt reports similar episodes of headache that never associated visual, sensory or language deficits. No previous fever. No previous neurological symptoms.     In ED, persisting neurological symptoms without resolution:  - Vitals: T 98 HR 61 /71 RR 18 Sat 98% on RA  - Labs: Gluc 88   - EKG: SR 60 bpm  - CT stroke protocol: No CT evidence of large acute territorial infarct.No evidence of acute intracranial pathology. No mass effect, midline   shift or intracranial hemorrhage.

## 2022-10-04 NOTE — H&P ADULT - NSHPLABSRESULTS_GEN_ALL_CORE
LABS:                         12.7   7.03  )-----------( 249      ( 04 Oct 2022 14:35 )             35.7       PT/INR - ( 04 Oct 2022 14:35 )   PT: 12.70 sec;   INR: 1.11 ratio       PTT - ( 04 Oct 2022 14:35 )  PTT:32.9 sec    RADIOLOGY, EKG & ADDITIONAL TESTS:

## 2022-10-04 NOTE — ED PROVIDER NOTE - OBJECTIVE STATEMENT
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 with PMHx of obesity and headaches (likely migraine without aura, never diagnosed by a doctor); FHx of mother with AMI (when 53 yo) with no other neurological disease is brought to the ED presenting on 10/4/2022 at 10:30 am when she was at her work, writing with a pen, she presented with a sudden onset of symptoms of right brachial sided weakness and pat-brachial sensory loss that have not resolve. Pt presented with holocranial headache a hour after started with her deficits with sonophobia and photophobia. In the hospital presenting with nausea. Pt reports similar episodes of headache that never associated visual, sensory or language deficits. No previous fever. No previous neurological symptoms.

## 2022-10-04 NOTE — CHART NOTE - NSCHARTNOTEFT_GEN_A_CORE
Nurse notified Neurovascular ACP of right hand numbness. Examined the patient at the bedside. Patient is AxO x3, no aphasia, dysarthria, visual fields in tact, EOM in tact, face symmetric, RUE 5/5, LUE 5/5, RLE 5/5, LLE 5/5,  strength 4/5 on the right compared to the left, no dysmetria noted. NIH-1 for sensory deficit for right hand. Ordered urgent CTH. Reviewed imaging with Dr. Mccarty. Pending official read by radiology. Continue with post alteplase neurochecks. 500 cc bolus given to patient as /73. Added diet order for patient. Discussed with Dr. Mccarty.

## 2022-10-04 NOTE — ED PROVIDER NOTE - PHYSICAL EXAMINATION
CONSTITUTIONAL: Well-developed; well-nourished; NAD  SKIN: warm, dry, w/o rash  HEAD: NCAT  EYES: PERRLA, EOMI, no conjunctival injection  ENT: No nasal discharge; nl OP without erythema or exudates  NECK: Supple, non-tender  CARD: nl S1, S2; RRR, no MRG, no JVD  RESP: CTAB, normal respiratory effort  ABD: BS+, soft, NTND, no HSM  EXT: Normal ROM.  No clubbing, cyanosis or edema  NEURO: Alert, oriented, +RUE weakness and numbness  PSYCH: Cooperative, appropriate

## 2022-10-04 NOTE — ED PROVIDER NOTE - NS ED ROS FT
CONSTITUTIONAL - No acute distress , No weight change  SKIN - No rash  HEMATOLOGIC - No abnormal bleeding or bruising  EYES - , No conjunctival injection, No drainage   ENT -, No sore throat, No neck pain, No rhinorrhea, No ear pain  RESPIRATORY - No shortness of breath, No cough  CARDIAC -No chest pain, No palpitations  GI - No abdominal pain, No nausea, No vomiting, No diarrhea, No constipation, No bright red blood per rectum or melena. No flank pain  - No dysuria, frequency, hematuria  MUSCULOSKELETAL - No joint paint, No swelling, No back pain  NEUROLOGIC - +RUE weakness and numbness, No headache, No dizziness  ALLERGIC- no pruritis

## 2022-10-04 NOTE — ED ADULT NURSE REASSESSMENT NOTE - NS ED NURSE REASSESS COMMENT FT1
Pt reports feeling numbness/tingling in right hand again. NIH score 1. Neuro called [id number]. At bedside to assess pt.

## 2022-10-04 NOTE — ED ADULT TRIAGE NOTE - CHIEF COMPLAINT QUOTE
Patient a+ox4 co tingling numbness to right hand that started 1030am this morning and now traveling up right arm X 1 hour ago. Pt also endorses headache. FS 88

## 2022-10-04 NOTE — CONSULT NOTE ADULT - SUBJECTIVE AND OBJECTIVE BOX
Neurology Consult    Patient is a 32F with no significant PMH presents with complaint of RUE weakness and paresthesias that began around 10:30. Symptoms persisted prompting visit to ED. Stroke code called on arrival, /71, NIHSS 2 for RUE sensory loss and RUE drift. CTH showed ______. CTA showed _______. CTP ______. Patient was within the window for tPA. Discussion of risks, benefits, and alternatives were discussed. Patient agreed to receive tPA. tPA administered at ________.      PAST MEDICAL & SURGICAL HISTORY:  Ovarian cyst      Miscarriage      Asthma  rare epsisodes- last use of albuterol 5yrs ago      H/O endoscopy      H/O breast augmentation          FAMILY HISTORY:      Social History: (-) x 3    Allergies    No Known Allergies    Intolerances        MEDICATIONS  (STANDING):    MEDICATIONS  (PRN):      Review of systems:    Constitutional: as per HPI  Eyes: No eye pain or discharge  ENMT:  No difficulty hearing; No sinus or throat pain  Neck: No pain or stiffness  Respiratory: No cough, wheezing, chills or hemoptysis  Cardiovascular: No chest pain, palpitations, shortness of breath, dyspnea on exertion  Gastrointestinal: No abdominal pain, nausea, vomiting or hematemesis; No diarrhea or constipation.   Genitourinary: No dysuria, frequency, hematuria or incontinence  Neurological: As per HPI  Skin: No rashes or lesions   Endocrine: No heat or cold intolerance; No hair loss  Musculoskeletal: No joint pain or swelling  Psychiatric: No depression, anxiety, mood swings  Heme/Lymph: No easy bruising or bleeding gums    Vital Signs Last 24 Hrs  T(C): 36.7 (04 Oct 2022 14:03), Max: 36.7 (04 Oct 2022 14:03)  T(F): 98 (04 Oct 2022 14:03), Max: 98 (04 Oct 2022 14:03)  HR: 61 (04 Oct 2022 14:03) (61 - 61)  BP: 118/71 (04 Oct 2022 14:03) (118/71 - 118/71)  BP(mean): --  RR: 18 (04 Oct 2022 14:03) (18 - 18)  SpO2: 99% (04 Oct 2022 14:03) (99% - 99%)    Parameters below as of 04 Oct 2022 14:03  Patient On (Oxygen Delivery Method): room air          Neurological Examination:  General:  Appearance is consistent with chronologic age.  No abnormal facies.  Gross skin survey within normal limits.    Cognitive/Language:  Awake, alert, and oriented to person, place, time and date.  Recent and remote memory intact.  Fund of knowledge is appropriate.  Naming, repetition and comprehension intact.   Nondysarthric.    Cranial Nerves  - Eyes: Visual acuity intact, Visual fields full.  EOMI w/o nystagmus, skew or reported double vision.  PERRL.  No ptosis/weakness of eyelid closure.    - Face:  Facial sensation normal V1 - 3, no facial asymmetry.    - Ears/Nose/Throat:  Hearing grossly intact b/l to finger rub.  Palate elevates midline.  Tongue and uvula midline.   Motor examination:  Upper Extremities: L 5/5, R 5/5; Lower extremities: L 5/5, R 5/5.  No observable drift. Normal tone and bulk. No tenderness, twitching, tremors or involuntary movements.  Sensory examination:   Intact to light touch and pinprick, pain, temperature and proprioception and vibration in all extremities.  Reflexes:   2+ b/l biceps, triceps, brachioradialis, patella and achilles.  Plantar response downgoing b/l.  Jaw jerk, Deon, clonus absent.  Cerebellum:   FTN/HKS intact.  No dysmetria or dysdiadokinesia.  Gait narrow based and normal.      Labs:                     [facility]     [facility] @ 14:22       Neurology Consult    Patient is a 32F with no significant PMH presents with complaint of RUE weakness and paresthesias that began around 10:30. Symptoms persisted prompting visit to ED. Stroke code called on arrival, /71, NIHSS 2 for RUE sensory loss and RUE drift. CTH showed ______. CTA showed _______. CTP ______. Patient denies any hx of smoking or OCP use. Patient was within the window for tPA. Discussion of risks, benefits, and alternatives were discussed. Patient agreed to receive tPA. tPA administered at ________.      PAST MEDICAL & SURGICAL HISTORY:  Ovarian cyst      Miscarriage      Asthma  rare epsisodes- last use of albuterol 5yrs ago      H/O endoscopy      H/O breast augmentation          FAMILY HISTORY:      Social History: (-) x 3    Allergies    No Known Allergies    Intolerances        MEDICATIONS  (STANDING):    MEDICATIONS  (PRN):      Review of systems:    Constitutional: as per HPI  Eyes: No eye pain or discharge  ENMT:  No difficulty hearing; No sinus or throat pain  Neck: No pain or stiffness  Respiratory: No cough, wheezing, chills or hemoptysis  Cardiovascular: No chest pain, palpitations, shortness of breath, dyspnea on exertion  Gastrointestinal: No abdominal pain, nausea, vomiting or hematemesis; No diarrhea or constipation.   Genitourinary: No dysuria, frequency, hematuria or incontinence  Neurological: As per HPI  Skin: No rashes or lesions   Endocrine: No heat or cold intolerance; No hair loss  Musculoskeletal: No joint pain or swelling  Psychiatric: No depression, anxiety, mood swings  Heme/Lymph: No easy bruising or bleeding gums    Vital Signs Last 24 Hrs  T(C): 36.7 (04 Oct 2022 14:03), Max: 36.7 (04 Oct 2022 14:03)  T(F): 98 (04 Oct 2022 14:03), Max: 98 (04 Oct 2022 14:03)  HR: 61 (04 Oct 2022 14:03) (61 - 61)  BP: 118/71 (04 Oct 2022 14:03) (118/71 - 118/71)  BP(mean): --  RR: 18 (04 Oct 2022 14:03) (18 - 18)  SpO2: 99% (04 Oct 2022 14:03) (99% - 99%)    Parameters below as of 04 Oct 2022 14:03  Patient On (Oxygen Delivery Method): room air          Neurological Examination:  NIH Stroke Scale:   · NIH Stroke Scale: LOC	(0) Alert; keenly responsive  · NIH Stroke Scale: LOC Question	(0) Answers both questions correctly  · NIH Stroke Scale: LOC Command	(0) Performs both tasks correctly  · NIH Stroke Scale: Gaze	(0) Normal  · NIH Stroke Scale: Visual	(0) No visual loss  · NIH Stroke Scale: Facial	(0) Normal symmetrical movements  · NIH Stroke Scale: Arm Left	(0) No drift; limb holds 90 (or 45) degrees for full 10 secs  · NIH Stroke Scale: Arm Right	(1) Drift; limb holds 90 (or 45) degrees, but drifts down before full 10 secs; does not hit bed or other support  · NIH Stroke Scale: Leg Left	(0) No drift; leg holds 30 degree position for full 5 secs  · NIH Stroke Scale: Leg Right	(0) No drift; leg holds 30 degree position for full 5 secs  · NIH Stroke Scale: Ataxia	(0) Absent  · NIH Stroke Scale: Sensory	(1) Mild-to-moderate sensory loss; patient feels pinprick is less sharp or is dull on the affected side; or there is a loss of superficial pain with pinprick, but patient is aware of being touched  · NIH Stroke Scale: Language	(0) No aphasia; normal  · NIH Stroke Scale: Dysarthria	(0) Normal  · NIH Stroke Scale: Extinct Inattention	(0) No abnormality  · NIH Stroke Scale: Total	2     Pre-stroke mRS Score:  mRS Score	(0) No symptoms        Labs:                     Neuroimaging:  [facility]:     10-04-22 @ 14:22       Neurology Consult    Patient is a 32F with no significant PMH presents with complaint of RUE weakness and paresthesias that began around 10:30. Symptoms persisted prompting visit to ED. Stroke code called on arrival, /71, NIHSS 2 for RUE sensory loss and RUE drift. CTH negative. CTA showed negative. CTP negative. Patient denies any hx of smoking or OCP use. Patient was within the window for tPA. Discussion of risks, benefits, and alternatives were discussed. Patient agreed to receive tPA. tPA administered at 14:48.      PAST MEDICAL & SURGICAL HISTORY:  Ovarian cyst      Miscarriage      Asthma  rare epsisodes- last use of albuterol 5yrs ago      H/O endoscopy      H/O breast augmentation          FAMILY HISTORY:      Social History: (-) x 3    Allergies    No Known Allergies    Intolerances        MEDICATIONS  (STANDING):    MEDICATIONS  (PRN):      Review of systems:    Constitutional: as per HPI  Eyes: No eye pain or discharge  ENMT:  No difficulty hearing; No sinus or throat pain  Neck: No pain or stiffness  Respiratory: No cough, wheezing, chills or hemoptysis  Cardiovascular: No chest pain, palpitations, shortness of breath, dyspnea on exertion  Gastrointestinal: No abdominal pain, nausea, vomiting or hematemesis; No diarrhea or constipation.   Genitourinary: No dysuria, frequency, hematuria or incontinence  Neurological: As per HPI  Skin: No rashes or lesions   Endocrine: No heat or cold intolerance; No hair loss  Musculoskeletal: No joint pain or swelling  Psychiatric: No depression, anxiety, mood swings  Heme/Lymph: No easy bruising or bleeding gums    Vital Signs Last 24 Hrs  T(C): 36.7 (04 Oct 2022 14:03), Max: 36.7 (04 Oct 2022 14:03)  T(F): 98 (04 Oct 2022 14:03), Max: 98 (04 Oct 2022 14:03)  HR: 61 (04 Oct 2022 14:03) (61 - 61)  BP: 118/71 (04 Oct 2022 14:03) (118/71 - 118/71)  BP(mean): --  RR: 18 (04 Oct 2022 14:03) (18 - 18)  SpO2: 99% (04 Oct 2022 14:03) (99% - 99%)    Parameters below as of 04 Oct 2022 14:03  Patient On (Oxygen Delivery Method): room air          Neurological Examination:  NIH Stroke Scale:   · NIH Stroke Scale: LOC	(0) Alert; keenly responsive  · NIH Stroke Scale: LOC Question	(0) Answers both questions correctly  · NIH Stroke Scale: LOC Command	(0) Performs both tasks correctly  · NIH Stroke Scale: Gaze	(0) Normal  · NIH Stroke Scale: Visual	(0) No visual loss  · NIH Stroke Scale: Facial	(0) Normal symmetrical movements  · NIH Stroke Scale: Arm Left	(0) No drift; limb holds 90 (or 45) degrees for full 10 secs  · NIH Stroke Scale: Arm Right	(1) Drift; limb holds 90 (or 45) degrees, but drifts down before full 10 secs; does not hit bed or other support  · NIH Stroke Scale: Leg Left	(0) No drift; leg holds 30 degree position for full 5 secs  · NIH Stroke Scale: Leg Right	(0) No drift; leg holds 30 degree position for full 5 secs  · NIH Stroke Scale: Ataxia	(0) Absent  · NIH Stroke Scale: Sensory	(1) Mild-to-moderate sensory loss; patient feels pinprick is less sharp or is dull on the affected side; or there is a loss of superficial pain with pinprick, but patient is aware of being touched  · NIH Stroke Scale: Language	(0) No aphasia; normal  · NIH Stroke Scale: Dysarthria	(0) Normal  · NIH Stroke Scale: Extinct Inattention	(0) No abnormality  · NIH Stroke Scale: Total	2     Pre-stroke mRS Score:  mRS Score	(0) No symptoms

## 2022-10-04 NOTE — H&P ADULT - NSHPPHYSICALEXAM_GEN_ALL_CORE
-Mental status: Awake, alert, oriented to person, place, and time. Speech is fluent with intact naming, repetition, and comprehension, no dysarthria. Recent and remote memory intact. Follows commands. Attention/concentration intact. Fund of knowledge appropriate.  -Cranial nerves:   II: Visual fields are full to confrontation.  III, IV, VI: Extraocular movements are intact without nystagmus. Pupils equally round and reactive to light  V:  Facial sensation V1-V3 with tactile hypoesthesia in the right side of the face.  VII: Face is symmetric with normal eye closure and smile  VIII: Hearing is bilaterally intact to finger rub  IX, X: Uvula is midline and soft palate rises symmetrically  XI: Head turning and shoulder shrug are intact.  XII: Tongue protrudes midline  Motor: Normal bulk and tone. Pronator drift. Strength with brachial upper right extremity 4/5, upper left extremity, 5/5  bilateral lower extremities 5/5.  Rapid alternating movements intact and symmetric  Sensation: faciobrachial hypoesthesia to light touch. No neglect or extinction on double simultaneous testing.  Coordination: No dysmetria on finger-to-nose and heel-to-shin bilaterally  Reflexes: Downgoing toes bilaterally   Gait: Untestable    NIHSS: 2 ASPECT Score: 10

## 2022-10-05 ENCOUNTER — TRANSCRIPTION ENCOUNTER (OUTPATIENT)
Age: 32
End: 2022-10-05

## 2022-10-05 VITALS
SYSTOLIC BLOOD PRESSURE: 101 MMHG | RESPIRATION RATE: 18 BRPM | HEART RATE: 64 BPM | TEMPERATURE: 98 F | DIASTOLIC BLOOD PRESSURE: 72 MMHG

## 2022-10-05 LAB
A1C WITH ESTIMATED AVERAGE GLUCOSE RESULT: 5.2 % — SIGNIFICANT CHANGE UP (ref 4–5.6)
ANION GAP SERPL CALC-SCNC: 9 MMOL/L — SIGNIFICANT CHANGE UP (ref 7–14)
BUN SERPL-MCNC: 9 MG/DL — LOW (ref 10–20)
CALCIUM SERPL-MCNC: 8.8 MG/DL — SIGNIFICANT CHANGE UP (ref 8.4–10.5)
CHLORIDE SERPL-SCNC: 106 MMOL/L — SIGNIFICANT CHANGE UP (ref 98–110)
CHOLEST SERPL-MCNC: 182 MG/DL — SIGNIFICANT CHANGE UP
CO2 SERPL-SCNC: 24 MMOL/L — SIGNIFICANT CHANGE UP (ref 17–32)
CREAT SERPL-MCNC: 0.7 MG/DL — SIGNIFICANT CHANGE UP (ref 0.7–1.5)
EGFR: 116 ML/MIN/1.73M2 — SIGNIFICANT CHANGE UP
ESTIMATED AVERAGE GLUCOSE: 103 MG/DL — SIGNIFICANT CHANGE UP (ref 68–114)
GLUCOSE SERPL-MCNC: 80 MG/DL — SIGNIFICANT CHANGE UP (ref 70–99)
HDLC SERPL-MCNC: 45 MG/DL — LOW
LIPID PNL WITH DIRECT LDL SERPL: 124 MG/DL — HIGH
NON HDL CHOLESTEROL: 137 MG/DL — HIGH
POTASSIUM SERPL-MCNC: 4.2 MMOL/L — SIGNIFICANT CHANGE UP (ref 3.5–5)
POTASSIUM SERPL-SCNC: 4.2 MMOL/L — SIGNIFICANT CHANGE UP (ref 3.5–5)
SODIUM SERPL-SCNC: 139 MMOL/L — SIGNIFICANT CHANGE UP (ref 135–146)
TRIGL SERPL-MCNC: 65 MG/DL — SIGNIFICANT CHANGE UP

## 2022-10-05 PROCEDURE — 70551 MRI BRAIN STEM W/O DYE: CPT | Mod: 26

## 2022-10-05 PROCEDURE — 93306 TTE W/DOPPLER COMPLETE: CPT | Mod: 26

## 2022-10-05 PROCEDURE — 99239 HOSP IP/OBS DSCHRG MGMT >30: CPT

## 2022-10-05 RX ORDER — ACETAMINOPHEN 500 MG
650 TABLET ORAL EVERY 6 HOURS
Refills: 0 | Status: DISCONTINUED | OUTPATIENT
Start: 2022-10-05 | End: 2022-10-05

## 2022-10-05 RX ORDER — SODIUM CHLORIDE 9 MG/ML
500 INJECTION INTRAMUSCULAR; INTRAVENOUS; SUBCUTANEOUS
Refills: 0 | Status: DISCONTINUED | OUTPATIENT
Start: 2022-10-05 | End: 2022-10-05

## 2022-10-05 RX ADMIN — SODIUM CHLORIDE 75 MILLILITER(S): 9 INJECTION INTRAMUSCULAR; INTRAVENOUS; SUBCUTANEOUS at 01:49

## 2022-10-05 RX ADMIN — Medication 650 MILLIGRAM(S): at 15:11

## 2022-10-05 RX ADMIN — Medication 650 MILLIGRAM(S): at 09:54

## 2022-10-05 RX ADMIN — ATORVASTATIN CALCIUM 80 MILLIGRAM(S): 80 TABLET, FILM COATED ORAL at 00:50

## 2022-10-05 RX ADMIN — SODIUM CHLORIDE 500 MILLILITER(S): 9 INJECTION INTRAMUSCULAR; INTRAVENOUS; SUBCUTANEOUS at 00:27

## 2022-10-05 RX ADMIN — Medication 650 MILLIGRAM(S): at 12:12

## 2022-10-05 NOTE — PHYSICAL THERAPY INITIAL EVALUATION ADULT - PERTINENT HX OF CURRENT PROBLEM, REHAB EVAL
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 with PMHx of obesity and headaches (likely migraine without aura, never diagnosed by a doctor); FHx of mother with AMI (when 53 yo) with no other neurological disease is brought to the ED presenting on 10/4/2022 at 10:30 am when she was at her work, writing with a pen, she presented with a sudden onset of symptoms of right brachial sided weakness and pat-brachial sensory loss that have not resolve. Pt presented with holocranial headache a hour after started with her deficits with sonophobia and photophobia. In the hospital presenting with nausea. Diferential diagnosis includes: right sensory-motor lacunar syndrome, migraine with aura and focal seizure.    #Right pat-brachial sensory-motor lacunar syndrome in relation with acute ischemic stroke  Right brachial sided weakness and pat-brachial sensory loss that have not resolved. On arrival NIHSS = 2 and ASPECTS = 10. 1 hour after rTPA started patient improved to her baseline. NIHSS = 0.

## 2022-10-05 NOTE — DISCHARGE NOTE PROVIDER - CARE PROVIDER_API CALL
George Ramos)  EEGEpilepsy; Neurology  [address] [place] [address], [address]  [place] [address]  Phone: [phone]  Fax: [phone]  Follow Up Time: 1 month

## 2022-10-05 NOTE — OCCUPATIONAL THERAPY INITIAL EVALUATION ADULT - NSOTDISCHREC_GEN_A_CORE
Pt independent/supervision with ADLs/functional transfers, will follow up 1x for OT to verify no worsening of symptoms/functional ability

## 2022-10-05 NOTE — OCCUPATIONAL THERAPY INITIAL EVALUATION ADULT - PERTINENT HX OF CURRENT PROBLEM, REHAB EVAL
32F with no significant PMH presents with complaint of RUE weakness and paresthesias that began around 10:30. Symptoms persisted prompting visit to ED. Stroke code called on arrival, /71, NIHSS 2 for RUE sensory loss and RUE drift. CTH negative. CTA showed negative. CTP negative. Patient denies any hx of smoking or OCP use. Patient was within the window for tPA. Discussion of risks, benefits, and alternatives were discussed. Patient agreed to receive tPA. tPA administered at 14:48.

## 2022-10-05 NOTE — PHYSICAL THERAPY INITIAL EVALUATION ADULT - NSACTIVITYREC_GEN_A_PT
discussed patient status pre and post session with RN ,  DC PT patient has no skilled PT needs, Patient to cont amb with nursing staff

## 2022-10-05 NOTE — DISCHARGE NOTE NURSING/CASE MANAGEMENT/SOCIAL WORK - PATIENT PORTAL LINK FT
You can access the FollowMyHealth Patient Portal offered by [facility] by registering at the following website: [email or web address]. By joining [facility]’s FollowMyHealth portal, you will also be able to view your health information using other applications (apps) compatible with our system.

## 2022-10-05 NOTE — DISCHARGE NOTE PROVIDER - NSDCCPCAREPLAN_GEN_ALL_CORE_FT
PRINCIPAL DISCHARGE DIAGNOSIS  Diagnosis: Migraine  Assessment and Plan of Treatment: A migraine is a headache that can cause severe throbbing pain or a pulsing sensation, usually on one side of the head. It's often accompanied by nausea, vomiting, and extreme sensitivity to light and sound. Migraine attacks can last for hours to days, and the pain can be so severe that it interferes with your daily activities.  For some people, a warning symptom known as an aura occurs before or with the headache. An aura can include visual disturbances, such as flashes of light or blind spots, or other disturbances, such as tingling on one side of the face or in an arm or leg and difficulty speaking.  A migraine usually lasts from 4 to 72 hours if untreated. How often migraines occur varies from person to person. Migraines might occur rarely or strike several times a month.  In your case, your symptoms were likely due to a Complicated Migraine; in which case you can experience symptoms of weakness or numbness resembling a stroke, with or without the presence of a headache.  See your doctor immediately or go to the emergency room if you have any of the following signs and symptoms, which could indicate a more serious medical problem:  - An abrupt, severe headache like a thunderclap  - Headache with fever, stiff neck, confusion, seizures, double vision, numbness or weakness in any part of the body, which could be a sign of a stroke      SECONDARY DISCHARGE DIAGNOSES  Diagnosis: Decreased sensation of hand and upper extremity  Assessment and Plan of Treatment:

## 2022-10-05 NOTE — SWALLOW BEDSIDE ASSESSMENT ADULT - SLP PERTINENT HISTORY OF CURRENT PROBLEM
PT admitted w/ R sided weakness and seneory loss, +photophobia and nausea. PMH: obesity, HA CTH (-), MRI pending s/p TPA CVA (cerebral vascular accident)    Diabetes mellitus    Gout    HTN (hypertension)    Parkinson disease

## 2022-10-05 NOTE — SWALLOW BEDSIDE ASSESSMENT ADULT - SLP GENERAL OBSERVATIONS
Pt received on stretcher in ED awake and alert on room air, speech is clear, denies changes in speech/language w/ onset of symptoms

## 2022-10-05 NOTE — DISCHARGE NOTE PROVIDER - HOSPITAL COURSE
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 with PMHx of obesity and headaches (likely migraine without aura, never diagnosed by a doctor); FHx of mother with AMI (when 53 yo) with no other neurological disease is brought to the ED presenting on 10/4/2022 at 10:30 am when she was at her work, writing with a pen, she presented with a sudden onset of symptoms of right brachial sided weakness and pat-brachial sensory loss that have not resolve. Pt presented with holocranial headache a hour after started with her deficits with sonophobia and photophobia. In the hospital presenting with nausea. Differential diagnosis includes: right sensory-motor lacunar syndrome, migraine with aura and focal seizure.    Patient had the following workup done in house:  CT Head: negative  CT Angio Head/Neck: negative  MR Head Non Contrast: negative  Echo: EF 59%, no PFO    Diagnosis: complicated migraine    Physical exam at discharge:  Neurological Examination:  General:  Appearance is consistent with chronologic age.  No abnormal facies.  Gross skin survey within normal limits.    Cognitive/Language:  Awake, alert, and oriented to person, place, time and date.  Recent and remote memory intact.  Fund of knowledge is appropriate.  Naming, repetition and comprehension intact.   Nondysarthric.    Cranial Nerves  - Eyes: Visual acuity intact, Visual fields full.  EOMI w/o nystagmus, skew or reported double vision.  PERRL.  No ptosis/weakness of eyelid closure.    - Face:  Facial sensation normal V1 - 3, no facial asymmetry.    - Ears/Nose/Throat:  Hearing grossly intact b/l to finger rub.  Palate elevates midline.  Tongue and uvula midline.   Motor examination:  Upper Extremities: L 5/5, R 5/5; Lower extremities: L 5/5, R 5/5.  No observable drift. Normal tone and bulk. No tenderness, twitching, tremors or involuntary movements.  Sensory examination:   Intact to light touch and pinprick, pain, temperature and proprioception and vibration in all extremities.  Cerebellum:   FTN/HKS intact.  No dysmetria or dysdiadokinesia.  Gait narrow based and normal.    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 with PMHx of obesity and headaches (likely migraine without aura, never diagnosed by a doctor); FHx of mother with AMI (when 53 yo) with no other neurological disease is brought to the ED presenting on 10/4/2022 at 10:30 am when she was at her work, writing with a pen, she presented with a sudden onset of symptoms of right brachial sided weakness and pat-brachial sensory loss that have not resolve. Pt presented with holocranial headache a hour after started with her deficits with sonophobia and photophobia. In the hospital presenting with nausea. Differential diagnosis includes: right sensory-motor lacunar syndrome, migraine with aura and focal seizure.    Patient had the following workup done in house:  CT Head: negative  CT Angio Head/Neck: negative  MR Head Non Contrast: negative  Echo: EF 59%, no PFO    Diagnosis: complicated migraine    Physical exam at discharge:  Neurological Examination:  General:  Appearance is consistent with chronologic age.  No abnormal facies.  Gross skin survey within normal limits.    Cognitive/Language:  Awake, alert, and oriented to person, place, time and date.  Recent and remote memory intact.  Fund of knowledge is appropriate.  Naming, repetition and comprehension intact.   Nondysarthric.    Cranial Nerves  - Eyes: Visual acuity intact, Visual fields full.  EOMI w/o nystagmus, skew or reported double vision.  PERRL.  No ptosis/weakness of eyelid closure.    - Face:  Facial sensation normal V1 - 3, no facial asymmetry.    - Ears/Nose/Throat:  Hearing grossly intact b/l to finger rub.  Palate elevates midline.  Tongue and uvula midline.   Motor examination:  Upper Extremities: L 5/5, R 5/5; Lower extremities: L 5/5, R 5/5.  No observable drift. Normal tone and bulk. No tenderness, twitching, tremors or involuntary movements.  Sensory examination:   Intact to light touch and pinprick, pain, temperature and proprioception and vibration in all extremities.  Cerebellum:   FTN/HKS intact.  No dysmetria or dysdiadokinesia.  Gait narrow based and normal.    Follow Up  New medications on discharge: none  Labs to be followed up: none  Further outpatient workup: follow up with Neurology    Attending Attestation:  Patient seen and examined and agree with above except as noted.  Patients history, notes, labs, imaging, vitals and meds reviewed personally.  Patient back to normal  MRI brain (-)   Developed headache soon after symptoms and has been getting migraines and headaches.  High suspicion for complicated migraine  Will have her follow up in neurology office as out patient  Given instructions to return to the ED for any new neurological symptoms

## 2022-10-05 NOTE — DISCHARGE NOTE PROVIDER - NSDCMRMEDTOKEN_GEN_ALL_CORE_FT
methocarbamol 500 mg oral tablet: 2 tab(s) orally 3 times a day   naproxen 500 mg oral tablet: 1 tab(s) orally 2 times a day   ondansetron 4 mg oral disintegrating strip: 1 each orally 3 times a day

## 2022-10-05 NOTE — DISCHARGE NOTE PROVIDER - NSDCFUSCHEDAPPT_GEN_ALL_CORE_FT
Dillan Perry  [facility]  [facility] [address]  Scheduled Appointment: 11/09/2022    [facility]  GENRG [address]  Scheduled Appointment: 11/16/2022

## 2022-10-05 NOTE — OCCUPATIONAL THERAPY INITIAL EVALUATION ADULT - GENERAL OBSERVATIONS, REHAB EVAL
Pt received semi turner on stretcher in ED, +tele, +BP cuff, +pulse oxi, +IV lock, agreeable to OT evaluation, left semi turner on stretcher with PT Velma present

## 2022-10-05 NOTE — SWALLOW BEDSIDE ASSESSMENT ADULT - SWALLOW EVAL: DIAGNOSIS
cintia-pharyngeal swallow is WFL for regular solids and thin liquids w/o overt s/s of penetration/aspiration

## 2022-10-05 NOTE — OCCUPATIONAL THERAPY INITIAL EVALUATION ADULT - ADL RETRAINING, OT EVAL
Patient will perform lower body dressing independently with use of appropriate adaptive equipment as needed by discharge. ; Patient will perform bathing independently with use of appropriate adaptive equipment and/or DME by discharge.

## 2022-10-10 DIAGNOSIS — R20.0 ANESTHESIA OF SKIN: ICD-10-CM

## 2022-10-10 DIAGNOSIS — G43.109 MIGRAINE WITH AURA, NOT INTRACTABLE, WITHOUT STATUS MIGRAINOSUS: ICD-10-CM

## 2022-10-10 DIAGNOSIS — Z79.1 LONG TERM (CURRENT) USE OF NON-STEROIDAL ANTI-INFLAMMATORIES (NSAID): ICD-10-CM

## 2022-10-10 DIAGNOSIS — E66.9 OBESITY, UNSPECIFIED: ICD-10-CM

## 2022-10-10 DIAGNOSIS — Z20.822 CONTACT WITH AND (SUSPECTED) EXPOSURE TO COVID-19: ICD-10-CM

## 2022-10-10 DIAGNOSIS — R20.9 UNSPECIFIED DISTURBANCES OF SKIN SENSATION: ICD-10-CM

## 2022-11-06 ENCOUNTER — LABORATORY RESULT (OUTPATIENT)
Age: 32
End: 2022-11-06

## 2022-11-08 NOTE — ASU PATIENT PROFILE, ADULT - TEACHING/LEARNING FACTORS INFLUENCE READINESS TO LEARN
[place] Pregnancy And Lactation Text: This medication is Pregnancy Category C. It is unknown if this medication is excreted in breast milk. none

## 2022-11-08 NOTE — ASU PATIENT PROFILE, ADULT - FALL HARM RISK - UNIVERSAL INTERVENTIONS
Bed in lowest position, wheels locked, appropriate side rails in place/Call bell, personal items and telephone in reach/Instruct patient to call for assistance before getting out of bed or chair/Non-slip footwear when patient is out of bed/[place] to call system/Physically safe environment - no spills, clutter or unnecessary equipment/Purposeful Proactive Rounding/Room/bathroom lighting operational, light cord in reach

## 2022-11-09 ENCOUNTER — OUTPATIENT (OUTPATIENT)
Dept: OUTPATIENT SERVICES | Facility: HOSPITAL | Age: 32
LOS: 1 days | Discharge: HOME | End: 2022-11-09
Payer: COMMERCIAL

## 2022-11-09 ENCOUNTER — TRANSCRIPTION ENCOUNTER (OUTPATIENT)
Age: 32
End: 2022-11-09

## 2022-11-09 ENCOUNTER — APPOINTMENT (OUTPATIENT)
Dept: SURGERY | Facility: AMBULATORY SURGERY CENTER | Age: 32
End: 2022-11-09
Payer: COMMERCIAL

## 2022-11-09 VITALS
OXYGEN SATURATION: 97 % | SYSTOLIC BLOOD PRESSURE: 109 MMHG | TEMPERATURE: 98 F | DIASTOLIC BLOOD PRESSURE: 66 MMHG | RESPIRATION RATE: 15 BRPM | HEART RATE: 60 BPM

## 2022-11-09 VITALS
SYSTOLIC BLOOD PRESSURE: 113 MMHG | RESPIRATION RATE: 20 BRPM | TEMPERATURE: 98 F | OXYGEN SATURATION: 99 % | HEIGHT: 70 IN | DIASTOLIC BLOOD PRESSURE: 64 MMHG | WEIGHT: 216.05 LBS | HEART RATE: 60 BPM

## 2022-11-09 DIAGNOSIS — Z98.890 OTHER SPECIFIED POSTPROCEDURAL STATES: Chronic | ICD-10-CM

## 2022-11-09 DIAGNOSIS — Z98.82 BREAST IMPLANT STATUS: Chronic | ICD-10-CM

## 2022-11-09 PROCEDURE — 49568: CPT

## 2022-11-09 PROCEDURE — 49561: CPT

## 2022-11-09 PROCEDURE — 49587: CPT | Mod: XS

## 2022-11-09 RX ORDER — OXYCODONE HYDROCHLORIDE 5 MG/1
5 TABLET ORAL ONCE
Refills: 0 | Status: DISCONTINUED | OUTPATIENT
Start: 2022-11-09 | End: 2022-11-09

## 2022-11-09 RX ORDER — SODIUM CHLORIDE 9 MG/ML
1000 INJECTION, SOLUTION INTRAVENOUS
Refills: 0 | Status: DISCONTINUED | OUTPATIENT
Start: 2022-11-09 | End: 2022-11-23

## 2022-11-09 RX ORDER — ONDANSETRON 8 MG/1
4 TABLET, FILM COATED ORAL ONCE
Refills: 0 | Status: DISCONTINUED | OUTPATIENT
Start: 2022-11-09 | End: 2022-11-23

## 2022-11-09 RX ORDER — HYDROMORPHONE HYDROCHLORIDE 2 MG/ML
0.5 INJECTION INTRAMUSCULAR; INTRAVENOUS; SUBCUTANEOUS
Refills: 0 | Status: DISCONTINUED | OUTPATIENT
Start: 2022-11-09 | End: 2022-11-09

## 2022-11-09 RX ORDER — ACETAMINOPHEN 500 MG
650 TABLET ORAL ONCE
Refills: 0 | Status: COMPLETED | OUTPATIENT
Start: 2022-11-09 | End: 2022-11-09

## 2022-11-09 RX ORDER — TRAMADOL HYDROCHLORIDE 50 MG/1
1 TABLET ORAL
Qty: 20 | Refills: 0
Start: 2022-11-09 | End: 2022-11-12

## 2022-11-09 RX ADMIN — Medication 650 MILLIGRAM(S): at 12:03

## 2022-11-09 NOTE — ASU DISCHARGE PLAN (ADULT/PEDIATRIC) - PROVIDER TOKENS
Pt called back, notified her that she still needs pelvic exam and breast exam. She no longer needs pap. Since she has not been seen in over 3 yr, she'll be considered new pt. Pt thought she no longer needed to come to office but would still be pt of office. New pt appt schedule 04/27/21 with . PROVIDER:[TOKEN:[[id number]],SCHEDULEDAPPT:[11/16/2022]]

## 2022-11-09 NOTE — BRIEF OPERATIVE NOTE - NSICDXBRIEFPOSTOP_GEN_ALL_CORE_FT
POST-OP DIAGNOSIS:  Incarcerated ventral hernia 09-Nov-2022 12:04:42  Dillan Perry  Incarcerated umbilical hernia 09-Nov-2022 13:01:54  Dillan Perry

## 2022-11-09 NOTE — BRIEF OPERATIVE NOTE - NSICDXBRIEFPROCEDURE_GEN_ALL_CORE_FT
PROCEDURES:  Repair of incarcerated ventral hernia with mesh 09-Nov-2022 12:05:24  Dillan Perry  Repair of incarcerated umbilical hernia using mesh 09-Nov-2022 13:01:57  Dillan Perry

## 2022-11-09 NOTE — ASU DISCHARGE PLAN (ADULT/PEDIATRIC) - CARE PROVIDER_API CALL
Dillan Perry)  Surgery  [address] [facility] [address]  [place] [address]  Phone: [phone]  Fax: [phone]  Scheduled Appointment: 11/16/2022

## 2022-11-09 NOTE — ASU DISCHARGE PLAN (ADULT/PEDIATRIC) - NS MD DC FALL RISK RISK
For information on Fall & Injury Prevention, visit: [email or web address] OR  [email or web address] OR  https://www.cdc.gov/steadi/patient.html

## 2022-11-09 NOTE — PACU DISCHARGE NOTE - COMMENTS
initially local sedation.  vomited fluid changed to general anesthesia endotrachial. otherwise uneventful procedure  115/72 91 16 97.5 97%

## 2022-11-14 DIAGNOSIS — K43.6 OTHER AND UNSPECIFIED VENTRAL HERNIA WITH OBSTRUCTION, WITHOUT GANGRENE: ICD-10-CM

## 2022-11-14 DIAGNOSIS — N83.209 UNSPECIFIED OVARIAN CYST, UNSPECIFIED SIDE: ICD-10-CM

## 2022-11-14 DIAGNOSIS — K42.0 UMBILICAL HERNIA WITH OBSTRUCTION, WITHOUT GANGRENE: ICD-10-CM

## 2022-11-14 DIAGNOSIS — J45.909 UNSPECIFIED ASTHMA, UNCOMPLICATED: ICD-10-CM

## 2022-11-16 ENCOUNTER — APPOINTMENT (OUTPATIENT)
Dept: SURGERY | Facility: CLINIC | Age: 32
End: 2022-11-16

## 2022-11-16 DIAGNOSIS — K42.9 UMBILICAL HERNIA W/OUT OBSTRUCTION OR GANGRENE: ICD-10-CM

## 2022-11-16 DIAGNOSIS — K43.6 OTHER AND UNSPECIFIED VENTRAL HERNIA WITH OBSTRUCTION, W/OUT GANGRENE: ICD-10-CM

## 2022-11-16 PROCEDURE — 99024 POSTOP FOLLOW-UP VISIT: CPT

## 2022-11-16 NOTE — ASSESSMENT
[FreeTextEntry1] : SAWYER UNDERWOOD underwent an  umbilical hernia and a ventral hernia repair with mesh with Dr. Perry on 11/9/22  under local IV sedation without any problems or complications. Her wound is clean, dry and intact. There is no evidence of erythema, seroma formation or infection. She is tolerating a diet and having normal bowel movements. She denies any significant postoperative pain or discomfort at this time.\par \par She was counseled and reassured. SAWYER was discharged from the office with no specific follow up necessary, but she knows to avoid any heavy lifting or strenuous activity for the next several weeks.\par She  was encouraged to continue to wear her abdominal binder for the better part of the next month.\par

## 2022-12-02 ENCOUNTER — APPOINTMENT (OUTPATIENT)
Dept: NEUROLOGY | Facility: CLINIC | Age: 32
End: 2022-12-02

## 2023-01-23 NOTE — ED PROVIDER NOTE - INCLUDE COVID-19 DISCHARGE INSTRUCTIONS
Please notify the patient of normal results.  Follow-up as planned.
<-------- Click here to INCLUDE CoVID-19 Discharge Instructions

## 2023-01-28 NOTE — ASU PREOP CHECKLIST - SITE MARKED BY ANESTHESIOLOGIST
Dr. Trent Taylor called back and spoke to [address] [facility]  22/04/31 0329 n/a

## 2023-01-30 ENCOUNTER — INPATIENT (INPATIENT)
Facility: HOSPITAL | Age: 33
LOS: 1 days | Discharge: HOME | End: 2023-02-01
Attending: STUDENT IN AN ORGANIZED HEALTH CARE EDUCATION/TRAINING PROGRAM | Admitting: STUDENT IN AN ORGANIZED HEALTH CARE EDUCATION/TRAINING PROGRAM
Payer: COMMERCIAL

## 2023-01-30 VITALS
DIASTOLIC BLOOD PRESSURE: 62 MMHG | SYSTOLIC BLOOD PRESSURE: 109 MMHG | OXYGEN SATURATION: 99 % | HEART RATE: 70 BPM | RESPIRATION RATE: 18 BRPM

## 2023-01-30 DIAGNOSIS — Z98.890 OTHER SPECIFIED POSTPROCEDURAL STATES: Chronic | ICD-10-CM

## 2023-01-30 DIAGNOSIS — Z98.82 BREAST IMPLANT STATUS: Chronic | ICD-10-CM

## 2023-01-30 LAB
ALBUMIN SERPL ELPH-MCNC: 4.3 G/DL — SIGNIFICANT CHANGE UP (ref 3.5–5.2)
ALP SERPL-CCNC: 67 U/L — SIGNIFICANT CHANGE UP (ref 30–115)
ALT FLD-CCNC: 20 U/L — SIGNIFICANT CHANGE UP (ref 0–41)
ANION GAP SERPL CALC-SCNC: 11 MMOL/L — SIGNIFICANT CHANGE UP (ref 7–14)
APTT BLD: 32.1 SEC — SIGNIFICANT CHANGE UP (ref 27–39.2)
AST SERPL-CCNC: 20 U/L — SIGNIFICANT CHANGE UP (ref 0–41)
BASOPHILS # BLD AUTO: 0.02 K/UL — SIGNIFICANT CHANGE UP (ref 0–0.2)
BASOPHILS NFR BLD AUTO: 0.3 % — SIGNIFICANT CHANGE UP (ref 0–1)
BILIRUB SERPL-MCNC: 0.5 MG/DL — SIGNIFICANT CHANGE UP (ref 0.2–1.2)
BUN SERPL-MCNC: 15 MG/DL — SIGNIFICANT CHANGE UP (ref 10–20)
CALCIUM SERPL-MCNC: 9.8 MG/DL — SIGNIFICANT CHANGE UP (ref 8.4–10.5)
CHLORIDE SERPL-SCNC: 105 MMOL/L — SIGNIFICANT CHANGE UP (ref 98–110)
CO2 SERPL-SCNC: 21 MMOL/L — SIGNIFICANT CHANGE UP (ref 17–32)
CREAT SERPL-MCNC: 0.8 MG/DL — SIGNIFICANT CHANGE UP (ref 0.7–1.5)
EGFR: 99 ML/MIN/1.73M2 — SIGNIFICANT CHANGE UP
EOSINOPHIL # BLD AUTO: 0.12 K/UL — SIGNIFICANT CHANGE UP (ref 0–0.7)
EOSINOPHIL NFR BLD AUTO: 1.5 % — SIGNIFICANT CHANGE UP (ref 0–8)
FLUAV AG NPH QL: SIGNIFICANT CHANGE UP
FLUBV AG NPH QL: SIGNIFICANT CHANGE UP
GLUCOSE SERPL-MCNC: 89 MG/DL — SIGNIFICANT CHANGE UP (ref 70–99)
HCT VFR BLD CALC: 38.4 % — SIGNIFICANT CHANGE UP (ref 37–47)
HGB BLD-MCNC: 13.3 G/DL — SIGNIFICANT CHANGE UP (ref 12–16)
IMM GRANULOCYTES NFR BLD AUTO: 0.5 % — HIGH (ref 0.1–0.3)
INR BLD: 1.17 RATIO — SIGNIFICANT CHANGE UP (ref 0.65–1.3)
LYMPHOCYTES # BLD AUTO: 0.92 K/UL — LOW (ref 1.2–3.4)
LYMPHOCYTES # BLD AUTO: 11.8 % — LOW (ref 20.5–51.1)
MCHC RBC-ENTMCNC: 30.5 PG — SIGNIFICANT CHANGE UP (ref 27–31)
MCHC RBC-ENTMCNC: 34.6 G/DL — SIGNIFICANT CHANGE UP (ref 32–37)
MCV RBC AUTO: 88.1 FL — SIGNIFICANT CHANGE UP (ref 81–99)
MONOCYTES # BLD AUTO: 0.54 K/UL — SIGNIFICANT CHANGE UP (ref 0.1–0.6)
MONOCYTES NFR BLD AUTO: 6.9 % — SIGNIFICANT CHANGE UP (ref 1.7–9.3)
NEUTROPHILS # BLD AUTO: 6.14 K/UL — SIGNIFICANT CHANGE UP (ref 1.4–6.5)
NEUTROPHILS NFR BLD AUTO: 79 % — HIGH (ref 42.2–75.2)
NRBC # BLD: 0 /100 WBCS — SIGNIFICANT CHANGE UP (ref 0–0)
PLATELET # BLD AUTO: 260 K/UL — SIGNIFICANT CHANGE UP (ref 130–400)
POTASSIUM SERPL-MCNC: 4.2 MMOL/L — SIGNIFICANT CHANGE UP (ref 3.5–5)
POTASSIUM SERPL-SCNC: 4.2 MMOL/L — SIGNIFICANT CHANGE UP (ref 3.5–5)
PROT SERPL-MCNC: 6.8 G/DL — SIGNIFICANT CHANGE UP (ref 6–8)
PROTHROM AB SERPL-ACNC: 13.4 SEC — HIGH (ref 9.95–12.87)
RBC # BLD: 4.36 M/UL — SIGNIFICANT CHANGE UP (ref 4.2–5.4)
RBC # FLD: 13 % — SIGNIFICANT CHANGE UP (ref 11.5–14.5)
RSV RNA NPH QL NAA+NON-PROBE: SIGNIFICANT CHANGE UP
SARS-COV-2 RNA SPEC QL NAA+PROBE: SIGNIFICANT CHANGE UP
SODIUM SERPL-SCNC: 137 MMOL/L — SIGNIFICANT CHANGE UP (ref 135–146)
TROPONIN T SERPL-MCNC: <0.01 NG/ML — SIGNIFICANT CHANGE UP
WBC # BLD: 7.78 K/UL — SIGNIFICANT CHANGE UP (ref 4.8–10.8)
WBC # FLD AUTO: 7.78 K/UL — SIGNIFICANT CHANGE UP (ref 4.8–10.8)

## 2023-01-30 PROCEDURE — 93010 ELECTROCARDIOGRAM REPORT: CPT

## 2023-01-30 PROCEDURE — 99221 1ST HOSP IP/OBS SF/LOW 40: CPT

## 2023-01-30 PROCEDURE — 95816 EEG AWAKE AND DROWSY: CPT | Mod: 26

## 2023-01-30 PROCEDURE — 99291 CRITICAL CARE FIRST HOUR: CPT

## 2023-01-30 PROCEDURE — 70498 CT ANGIOGRAPHY NECK: CPT | Mod: 26,MA

## 2023-01-30 PROCEDURE — 70496 CT ANGIOGRAPHY HEAD: CPT | Mod: 26,MA

## 2023-01-30 PROCEDURE — 99451 NTRPROF PH1/NTRNET/EHR 5/>: CPT

## 2023-01-30 PROCEDURE — 0042T: CPT | Mod: MA

## 2023-01-30 RX ORDER — SODIUM CHLORIDE 9 MG/ML
10 INJECTION INTRAMUSCULAR; INTRAVENOUS; SUBCUTANEOUS ONCE
Refills: 0 | Status: COMPLETED | OUTPATIENT
Start: 2023-01-30 | End: 2023-01-30

## 2023-01-30 RX ORDER — TENECTEPLASE 50 MG
24 KIT INTRAVENOUS ONCE
Refills: 0 | Status: COMPLETED | OUTPATIENT
Start: 2023-01-30 | End: 2023-01-30

## 2023-01-30 RX ORDER — INFLUENZA VIRUS VACCINE 15; 15; 15; 15 UG/.5ML; UG/.5ML; UG/.5ML; UG/.5ML
0.5 SUSPENSION INTRAMUSCULAR ONCE
Refills: 0 | Status: DISCONTINUED | OUTPATIENT
Start: 2023-01-30 | End: 2023-02-01

## 2023-01-30 RX ORDER — ONDANSETRON 8 MG/1
4 TABLET, FILM COATED ORAL EVERY 6 HOURS
Refills: 0 | Status: DISCONTINUED | OUTPATIENT
Start: 2023-01-30 | End: 2023-02-01

## 2023-01-30 RX ADMIN — TENECTEPLASE 3456 MILLIGRAM(S): KIT at 09:19

## 2023-01-30 RX ADMIN — SODIUM CHLORIDE 10 MILLILITER(S): 9 INJECTION INTRAMUSCULAR; INTRAVENOUS; SUBCUTANEOUS at 09:19

## 2023-01-30 RX ADMIN — SODIUM CHLORIDE 10 MILLILITER(S): 9 INJECTION INTRAMUSCULAR; INTRAVENOUS; SUBCUTANEOUS at 09:20

## 2023-01-30 NOTE — ED PROVIDER NOTE - ATTENDING CONTRIBUTION TO CARE
Patient complains of right-sided weakness.  Symptoms started at 7:40 AM today.  This is considered the last known well.  Patient has a history of a previous episode of weakness in October 2022.  She received tPA at that time.  Her symptoms improved after administration of thrombolysis.  At that time diagnosis of stroke versus complex migraine or contemplated.  Today the patient denies headache, change in speech, change in vision.  Vital signs noted.  Chest is clear.  Heart regular rate.  Abdomen nontender.  NIH stroke scale documented as 5.  This is due to right sided weakness and sensory loss.  Case discussed with telestroke neurology.  Tenecteplase administered.  Patient will be admitted to the ICU.

## 2023-01-30 NOTE — H&P ADULT - NSHPREVIEWOFSYSTEMS_GEN_ALL_CORE

## 2023-01-30 NOTE — H&P ADULT - NSHPPHYSICALEXAM_GEN_ALL_CORE
PHYSICAL EXAM:  GENERAL: NAD, lying in bed comfortably  HEAD:  Atraumatic, Normocephalic  EYES: EOMI, PERRLA, conjunctiva and sclera clear  ENT: Moist mucous membranes  NECK: Supple, No JVD  CHEST/LUNG: Clear to auscultation bilaterally; No rales, rhonchi, wheezing, or rubs. Unlabored respirations  HEART: Regular rate and rhythm; No murmurs, rubs, or gallops  ABDOMEN: Bowel sounds present; Soft, Nontender, Nondistended. No hepatomegally  EXTREMITIES:  2+ Peripheral Pulses, brisk capillary refill. No clubbing, cyanosis, or edema  NERVOUS SYSTEM:  Alert & Oriented X3, speech clear. No deficits   MSK: FROM all 4 extremities, full and equal strength  SKIN: No rashes or lesions PHYSICAL EXAM:  GENERAL: NAD, lying in bed comfortably  HEAD:  Atraumatic, Normocephalic  EYES: EOMI, PERRLA, conjunctiva and sclera clear  ENT: Moist mucous membranes  NECK: Supple, No JVD  CHEST/LUNG: Clear to auscultation bilaterally; No rales, rhonchi, wheezing, or rubs. Unlabored respirations  HEART: Regular rate and rhythm; No murmurs, rubs, or gallops  ABDOMEN: Bowel sounds present; Soft, Nontender, Nondistended.   EXTREMITIES:  2+ Peripheral Pulses, brisk capillary refill. No clubbing, cyanosis, or edema  NERVOUS SYSTEM:  Alert & Oriented X3, speech clear. No deficits   MSK: FROM all 4 extremities, full and equal strength  SKIN: No rashes or lesions

## 2023-01-30 NOTE — ED PROVIDER NOTE - DOOR TO IV THROMBOLYTIC THERAPY TIME < 30 MINUTES
Asked Dr. Nolan if she will still remove the swan adrianna catheter, she said the day team will do it.  
No
[id number]

## 2023-01-30 NOTE — ED ADULT TRIAGE NOTE - DIRECT TO ROOM CARE INITIATED:
Received signed prescription for Oxycodone. Pt's wife was notified.     Nadia Page RN, BSN.     30-Jan-2023 08:37

## 2023-01-30 NOTE — PATIENT PROFILE ADULT - STATED REASON FOR ADMISSION
While driving right arm weakness, chest tightness, trouble breathing, dizzy. right hand numb, felt like I was going to fall. Ambulance called at the school.

## 2023-01-30 NOTE — H&P ADULT - ASSESSMENT
32 year old female with no hx presents with sudden onset RUE, RLE weakness and dysarthria, word finding difficulty admitted for suspected stroke, s/p cva.     IMPRESSION    Suspceted cva  Complext Migraine    IMPRESSION:      PLAN:    CNS: Avoid Sedatives. S/p TNK NIHSS 0 on exam follow post-TNK orders  Neurology consult, repeat CT head, sedation by XXXX, neurochecks Q1H. MRI brain w/o con. MARLEN. REEG. PT/OT/PMR/S&S    HEENT: Oral care, HOB at 45, speech and swallow evaluation    PULMONARY: Keep sat >92%    CARDIOVASCULAR: ECHO    GI: GI prophylaxis not needed.  Feeding when evaluated by S/S     RENAL: I=0, monitor electrolytes and replete as needed    INFECTIOUS DISEASE: Monitor off Antibiotics    HEMATOLOGICAL:  No DVT prophylaxis, blood draws     ENDOCRINE:  Follow up FS.  Insulin protocol if needed.    MUSCULOSKELETAL: Pt/rehab    TOXICOLOGY: Urine toxicology          32 year old female with no hx presents with sudden onset RUE, RLE weakness and dysarthria, word finding difficulty admitted for suspected stroke, s/p cva.     IMPRESSION    Suspceted cva  Complext Migraine    PLAN:    CNS: Avoid Sedatives. S/p TNK NIHSS 0 on exam follow post-TNK orders  Neurology consult. Stroke w/u neg so far  sedation by XXXX, neurochecks Q1H. MRI brain w/o con. MARLEN. REEG. PT/OT/PMR/S&S    HEENT: Oral care, HOB at 45, speech and swallow evaluation    PULMONARY: Keep sat >92%    CARDIOVASCULAR: ECHO    GI: GI prophylaxis not needed.  Feeding when evaluated by S/S     RENAL: I=0, monitor electrolytes and replete as needed    INFECTIOUS DISEASE: Monitor off Antibiotics    HEMATOLOGICAL:  No DVT prophylaxis, blood draws, gao, ngt    ENDOCRINE:  Follow up FS.  Insulin protocol if needed.    MUSCULOSKELETAL: Pt eval     TOXICOLOGY: Urine toxicology         32 year old female with no hx presents with sudden onset RUE, RLE weakness and dysarthria, word finding difficulty admitted for suspected stroke, s/p cva.     IMPRESSION    Suspceted cva  Complext Migraine    PLAN:    CNS: Avoid Sedatives. S/p TNK NIHSS 0 on exam follow post-TNK orders  Neurology consult. Stroke w/u neg so far  sedation by XXXX, neurochecks Q1H. MRI brain w/o con. MARLEN. REEG. PT/OT/PMR/S&S    HEENT: Oral care, HOB at 45, speech and swallow evaluation    PULMONARY: Keep sat >92%    CARDIOVASCULAR: ECHO    GI: GI prophylaxis not needed.  Feeding when evaluated by S/S     RENAL: I=0, monitor electrolytes and replete as needed    INFECTIOUS DISEASE: Monitor off Antibiotics    HEMATOLOGICAL:  No DVT prophylaxis, blood draws, gao, ngt    ENDOCRINE:  Follow up FS.  Insulin protocol if needed.    MUSCULOSKELETAL: Pt eval     TOXICOLOGY: Urine toxicology

## 2023-01-30 NOTE — H&P ADULT - ATTENDING COMMENTS
32 year old female with no hx presents with sudden onset RUE, RLE weakness and dysarthria, word finding difficulty admitted for suspected stroke, s/p cva.       # RUE and RLE weakness and dysarthria sp thrombolytic  # H/O Complex Migraine   # H/O similar presentation in 10/2022 with negative stroke w/up   - CT perfusion neg / CTAHN neg  - no SCD / AC / DVT PPX for 24 hrs post tnk  - no gao 24 hrs post tnk  - no ngt 24 hrs post tnk  - Q1H neurological checks   - MRI brain w/o contrast  - Repeat CTH in 24 hrs post tnk  - keep blood pressure < 180/105   - strict bedrest for 12 hrs post tnk  - Neurology eval       # DVT PPX : none at this time  # GI PPX: PPI  # Diet: per speech and swallow  # Activity: bedrest   # Dispo: acute

## 2023-01-30 NOTE — PATIENT PROFILE ADULT - FALL HARM RISK - HARM RISK INTERVENTIONS
Assistance with ambulation/Assistance OOB with selected safe patient handling equipment/Communicate Risk of Fall with Harm to all staff/Monitor gait and stability/Reinforce activity limits and safety measures with patient and family/Sit up slowly, dangle for a short time, stand at bedside before walking/Tailored Fall Risk Interventions/Visual Cue: Yellow wristband and red socks/Bed in lowest position, wheels locked, appropriate side rails in place/Call bell, personal items and telephone in reach/Instruct patient to call for assistance before getting out of bed or chair/Non-slip footwear when patient is out of bed/[place] to call system/Physically safe environment - no spills, clutter or unnecessary equipment/Purposeful Proactive Rounding/Room/bathroom lighting operational, light cord in reach

## 2023-01-30 NOTE — H&P ADULT - NSHPLABSRESULTS_GEN_ALL_CORE
13.3   7.78  )-----------( 260      ( 30 Jan 2023 09:15 )             38.4   01-30-23 @ 09:15    137  |  105  |  15             --------------------------< 89     4.2  |  21  | 0.8    eGFR AA: --  eGFR N-AA: --    Calcium: 9.8  Phosphorus: --  Magnesium: --    AST: 20    ALT: 20  AlkPhos: 67  Protein: 6.8  Albumin: 4.3  TBili: 0.5  D-Bili: --    < from: CT Angio Neck Stroke Protocol w/ IV Cont (01.30.23 @ 08:56) >    CT PERFUSION:    There is no evidence of perfusion mismatch.    HEAD CTA:    The internal carotid arteries demonstrate normal enhancement to the   intracranial circulation and [place] of [place].    Anterior and middle cerebral arteries demonstrate normal enhancement and   symmetric arborization without intraluminal filling defect, stenosis,   occlusion, aneurysm or vascular malformation.    The intradural vertebral arteries demonstrate normal enhancement to the   vertebrobasilar confluence. Branch vasculature of the posterior   circulation are within normal limits. The posterior cerebral arteries   demonstrate symmetric enhancement and arborization without evidence for   aneurysm, stenosis, occlusion or vascular malformation. The bilateral P1   segments are hypoplastic.    Visualized dural venous sinuses and deep cerebral venous structures   demonstrate normal enhancement without evidence for filling defect.    NECK CTA:    The aortic arch and origins of the great vessels are within normal limits.    Right:  The origin of the right common carotid artery is normal. The   right common carotid artery is normal in course and caliber to the   carotid bifurcation. Origins of the internal and external carotid   arteries are normal by NASCET criteria. The right internal carotid artery   has normal course and caliber to the intracranial circulation.    The origin of the right vertebral artery is normal. The right vertebral   artery is normal in course and caliber to the intracranial circulation.    Left: The origin of the left common carotid artery is normal. The left   common carotid artery is normal in course and caliber to the carotid   bifurcation. Origins of the internal and external carotid arteries are   normal by NASCET criteria. The left internal carotid artery has normal   course and caliber to the intracranial circulation.    The origin of the left vertebral artery is normal. The left vertebral   artery is normal in course and caliber to the intracranial circulation.    IMPRESSION:    CT PERFUSION:  No evidence of perfusion abnormality.    CTA HEAD:  No evidence of flow-limiting stenosis, occlusion or aneurysm.    CTA NECK:    < end of copied text >

## 2023-01-30 NOTE — ED ADULT TRIAGE NOTE - CHIEF COMPLAINT QUOTE
pt complaining of r sided weakness dizziness  started this morning while driving at 0715 pt complaining of r sided weakness dizziness  started this morning while driving at 0740

## 2023-01-30 NOTE — CONSULT NOTE ADULT - ASSESSMENT
Patient is a 32 year old female with no hx presents with sudden onset RUE, RLE weakness and dysarthria, word finding difficulty. Patient was brought to ED, NIHSS 5, received thrombolytic, now NIHSS 0. Exam remarkable for RUE weakness with no drift. Patient had same episode Oct 2022, diagnosed with complex migraine as stroke workup was negative. Stroke imaging negative today.    # r/o CVA  - s/p TNK in ED, follow post-TNK orders  - NIHSS 0 on exam  - TTE  - MRI brain non contrast  - REEG  - PT/OT/PMR/S&S    this is an incomplete note pending attending exam

## 2023-01-30 NOTE — ED PROVIDER NOTE - CLINICAL SUMMARY MEDICAL DECISION MAKING FREE TEXT BOX
In my opinion, in patient treatment is medically justifiable and appropriate.  Patient presents with acute stroke symptoms.  She received thrombolytic therapy.  She had improvement in her neurological symptoms.

## 2023-01-30 NOTE — CHART NOTE - NSCHARTNOTEFT_GEN_A_CORE
Discussion with: Dr. Juárez    HISTORY OF PRESENT ILLNESS:  Ms. (SAWYER UNDERWOOD) ( [id number] ) 31 yo Female who presents with a chief complaint of right arm/leg weakness and numbness. Pt had similar symptoms last October with right arm paresis and numbness, was treated with IV tPA on 10/4/22, with subsequent resolution of symptoms. MRI brain was negative. DDx included stroke vs complex migraine. Per ED physician, pt denies having migraine/headache. She had an umbilical hernia mesh placed 11/9/22.     PMH: Ovarian cyst  Miscarriage   Asthma: rare episodes- last use of albuterol 5yrs ago  H/O endoscopy  H/O breast augmentation      Presented to ([facility]) on (1/30/2023). Last known normal time was 0715.      Pre-stroke MRS= 0   *Modified Last Score   0 - No symptoms.  1 - No significant disability. Able to carry out all usual activities, despite some symptoms.  2 - Slight disability. Able to look after own affairs without assistance, but unable to carry out all previous activities.  3 - Moderate disability. Requires some help, but able to walk unassisted.  4 - Moderately severe disability. Unable to attend to own bodily needs without assistance, and unable to walk unassisted.  5 - Severe disability. Requires constant nursing care and attention, bedridden, incontinent.    CT HEAD IMAGING RESULTS:  CT Head: no acute process  CTA: read pending, no ELVO per my read  CTP: CBF<30%=  0mL                                                   Tmax>6 secs= 0mL    Labs:  CAPILLARY BLOOD GLUCOSE        NEUROLOGIC EXAMINATION deferred – interprofessional audio discussion only     Inclusion Criteria:  Clearly defined time onset within 4.5 hours of treatment Yes [x] No []    Ischemic stroke with a measurable deficit on NIHSS or a non-measurable deficit that is deemed disabling?  Yes [x] no []  or  Unclear time of onset wake-up with stroke symptoms yes [] no[]  If yes:  [] Treatment can be initiated within 4.5 hrs. from symptom recognition  [] MRI can be obtained acutely from the emergency department  [] Not an endovascular stroke therapy candidate  [] Baseline functional independence.  Pre-stroke mRS of 0-1  [] NIHSS less than 26  [] DW-MRI with diffusion-positive. FLAIR – negative lesions indicating timing of stroke onset less than 4.5 hrs.  [] MRI mismatch between abnormal signal on DW-MRI and no visible signal change on FLAIR        Review thrombolytic CONTRAINDICATIONS  [x] None    ABSOLUTE EXCLUSION CRITERIA:  [] Patient outside of the appropriate time window for IV thrombolysis  [] Evidence of intracranial hemorrhage on pretreatment CT scan (CT must be read by an attending radiologist or attending neurologist)  [] Head CT findings suggesting an established acute cerebral infarction as evidenced by efren hypodensity, regardless of size.  [] Clinical presentation consistent with subarachnoid hemorrhage, even with normal CT scan  [] Active internal bleeding  [] Known bleeding diathesis (including but not limited to platelet count < 100,000, use of oral anticoagulants with INR>1.7, use of full dose low molecular       weight heparin within the last 24 hours, use of unfractionated heparin AND a prolonged PTT (> 40sec), use of direct thrombin inhibitor (e.g. dabigatran) or oral direct Factor Xa inhibitor (e.g. rivaroxaban, apixaban) within 48 hours) with any degree of abnormality on any coagulation test; any DOAC taken within 3 hours of presentation, regardless of coagulation test results  [] Systolic pressure >= 185 mmHg or diastolic pressure 110 mmHg on repeated measurements at the time treatment is to begin  [] Care team unable to determine eligibility for IV thrombolysis  [] Patient, family, or surrogate declines and understands risks and benefits of treatment.  [] For wake-up Protocol patients: DW-MRI lesions larger than one-third of the MCA territory    RELATIVE EXCLUSION CRITERIA  [] Isolated neurological deficits (except for aphasia or hemianopsia)  [] stroke severity too mild (non-disabling)  [] Seizure at onset with post-ictal residual neurological impairment  [] Gastrointestinal, genitourinary or respiratory hemorrhage within 21 days   [] Major surgery within 14 days   [] Blood glucose level < 50 or > 400 mG/dL  [] CPR with chest compressions or minor surgery (including liver and kidney biopsy, thoracocentesis, lumbar puncture) within 10 days   [] Arterial puncture at non-compressible site within 7 days   [] Evidence of acute trauma (fracture)   Significant head trauma or prior stroke in the previous 3 months  History of previous intracranial hemorrhage  Intracranial or intraspinal surgery within past 3 months[] Diabetic hemorrhagic retinopathy or ophthalmic bleeding  [] Pregnancy or peripartum; no nursing post- treatment  [] Post-myocardial infarction pericarditis  [] Peritoneal dialysis or hemodialysis or severe hepatic disease   [] Known bacterial endocarditis   [] Life expectancy less than 6 months or sever co-morbid illness   [] Known cerebral vascular malformation, untreated intracranial aneurysm or brain tumor (may consider IV alteplase in patients with CNS lesions that have a very low likelihood of hemorrhage, such as small un-ruptured aneurysms or benign tumors with low vascularity.  [] Social/Taoist reason  [] Other ____________________    RISKS/BENEFITS DISCUSSION done by ED provider.  RECOMMENDATIONS:  [x] decision/agree to treat – 01/30/2023 at 0915  [] Administer thrombolytic  [] During IV thrombolytic administration and post treatment follow the vital sign/neuro check protocol  [] Admit to ICU for further stroke care  my      Plan discussed with Dr. Juárez at 01/30/2023 0910.  Intracerebral Hemorrhage:        No         Time spent during discussion (in minutes):  5 minutes

## 2023-01-30 NOTE — ED PROVIDER NOTE - PROGRESS NOTE DETAILS
Franck: I accompanied the patient to CAT scan.  I reviewed the CAT scan images in real-time.  I saw no bleed.  This aided my medical decision making.  This was confirmed later with a discussion with the radiologist to confirm that there was no intracranial bleeding. Franck: d/w neuro: TNK advised Franck: The dose of thrombolytics administered was confirmed.  The patient has no history of intracranial hemorrhage or evidence of intracranial hemorrhage on CT.  The following was verified: the patient has not taken a direct-acting oral anticoagulant (DOAC) in past 48 hours OR if one has been taken between 3 and 48 hours, coagulation studies been confirmed, and all results are normal.  If patient is on warfarin, the INR < 1.7.  The patient’s last known well time is within 4.5 hours.  The risks, benefits, alternatives, and potential complications of thrombolytics (including bleeding in the brain or other parts of the body, allergic reaction, and/or even death) were discussed. We believe the benefits of IV thrombolytics outweigh the risks of the drug.  Through shared decision making, the patient and family expressed understanding and agree with plan to treat potential stroke with thrombolytics. Patient and family informed of potential for stroke mimics.  An opportunity to ask questions and have them answered was provided. Franck: Weakness resolved.

## 2023-01-30 NOTE — CONSULT NOTE ADULT - SUBJECTIVE AND OBJECTIVE BOX
SAWYER UNDERWOOD     32y     Female    MRN-[id number]                                                           CC:Patient is a 32y old  Female who presents with a chief complaint of     HPI: Patient is a 32 year old female with no hx presents with sudden onset RUE, RLE weakness and dysarthria, word finding difficulty. Patient was brought to ED, NIHSS 5, received thrombolytic, now NIHSS 0. Patient had same episode Oct 2022, diagnosed with complex migraine as stroke workup was negative, received tpa. Dc on no meds. Was unable to follow up with neuro post dc.      ROS:  Constitutional, Neurological, Psychiatric, Eyes, ENT, Cardiovascular, Respiratory, Gastrointestinal, Genitourinary, Musculoskeletal, Integumentary, Endocrine and Heme/Lymph are otherwise negative.    Social History: No smoking, No drinking, No drug use    FAMILY HISTORY:  biological mother  of MI at 50    HEALTH ISSUES - PROBLEM Dx:          Vital Signs Last 24 Hrs  T(C): 36.7 (2023 12:01), Max: 36.7 (2023 09:15)  T(F): 98.1 (2023 12:01), Max: 98.1 (2023 12:01)  HR: 72 (2023 12:01) (70 - 89)  BP: 122/65 (2023 12:01) (109/62 - 127/77)  RR: 18 (2023 12:01) (18 - 18)  SpO2: 98% (2023 12:01) (96% - 100%)    Parameters below as of 2023 12:01  Patient On (Oxygen Delivery Method): room air          Neuro Exam:  Orientation: oriented to person, oriented to place and oriented to time.   Attention: normal concentrating ability and visual attention was not decreased.   Language: no difficulty naming common objects, no difficulty repeating a phrase, no difficulty writing a sentence, fluency intact, comprehension intact and reading intact.   Fund of knowledge: displays adequate knowledge of personal past history.   Cranial Nerves: visual acuity intact bilaterally, visual fields full to confrontation, pupils equal round and reactive to light, extraocular motion intact, facial sensation intact symmetrically, face symmetrical, hearing was intact bilaterally, tongue and palate midline, head turning and shoulder shrug symmetric and there was no tongue deviation with protrusion.   Motor: muscle tone was normal in all four extremities, normal bulk in all four extremities. R deltoid 4+/5, R bicep 4+/5, R  4+/5, otherwise 5/5  Sensory exam: light touch was intact.   Coordination:.  balance was intact. there was no past-pointing. no tremor present.       NIHSS: 0    Allergies    No Known Allergies      MEDICATIONS  (STANDING):  influenza   Vaccine 0.5 milliLiter(s) IntraMuscular once    MEDICATIONS  (PRN):      LABS:                        13.3   7.78  )-----------( 260      ( 2023 09:15 )             38.4         137  |  105  |  15  ----------------------------<  89  4.2   |  21  |  0.8    Ca    9.8      2023 09:15    TPro  6.8  /  Alb  4.3  /  TBili  0.5  /  DBili  x   /  AST  20  /  ALT  20  /  AlkPhos  67      PT/INR - ( 2023 09:15 )   PT: 13.40 sec;   INR: 1.17 ratio         PTT - ( 2023 09:15 )  PTT:32.1 sec        CT Brain Stroke Protocol (23 @ 08:44)   IMPRESSION:  No CT evidence of large acute territorial infarct.  No evidence of acute intracranial pathology. No mass effect, midline   shift or intracranial hemorrhage.    CT Brain Perfusion Maps Stroke (23 @ 08:55)  IMPRESSION:  CT PERFUSION:  No evidence of perfusion abnormality.  CTA HEAD:  No evidence of flow-limiting stenosis, occlusion or aneurysm.  CTA NECK:  No evidence of carotid or vertebral artery stenosis.

## 2023-01-30 NOTE — ED PROVIDER NOTE - OBJECTIVE STATEMENT
33 yo f no pmh presnts with acute onset RT upper and lower extremity weakness. LKW at 7:40am when she called her coworkers at work. Denies trauma, cp, sob, headache, dizziness  In Oct patient had similar sxs and was ultimately dx as migraine but couldn't ro CVA. Has not fu with Neuro since then

## 2023-01-30 NOTE — H&P ADULT - HISTORY OF PRESENT ILLNESS
33 yo F with PMHx complex migraine presents with sudden onset RUE, RLE weakness and dysarthria, word finding difficulty since 7:40 today.    In the ED, stroke code was called, NIHSS 5, received thrombolytic, now NIHSS 0. Of note, pt had same episode Oct 2022, given tpa, stroke w/u was negative and she was diagnosed with complex migraine, d/c off meds.     In the ED VS    33 yo F with PMHx complex migraine presents with sudden onset RUE, RLE weakness and dysarthria, word finding difficulty since 7:40 today.    In the ED, stroke code was called, NIHSS 5, received thrombolytic, now NIHSS 0. Of note, pt had same episode Oct 2022, given tpa, stroke w/u was negative and she was diagnosed with complex migraine, d/c off meds.     In the ED VS ICU Vital Signs Last 24 Hrs  T(C): 36.7 (30 Jan 2023 12:01), Max: 36.7 (30 Jan 2023 09:15)  T(F): 98.1 (30 Jan 2023 12:01), Max: 98.1 (30 Jan 2023 12:01)  HR: 85 (30 Jan 2023 13:31) (70 - 89)  BP: 121/75 (30 Jan 2023 13:31) (109/62 - 127/77)  BP(mean): 93 (30 Jan 2023 13:31) (86 - 93)  ABP: --  ABP(mean): --  RR: 31 (30 Jan 2023 13:31) (18 - 35)  SpO2: 96% (30 Jan 2023 13:31) (96% - 100%)    O2 Parameters below as of 30 Jan 2023 12:01  Patient On (Oxygen Delivery Method): room air      Labs obtained prior to thrombolytic wnl.    33 yo F with PMHx complex migraine presents with sudden onset RUE, RLE weakness and dysarthria, word finding difficulty since 7:40 today.    In the ED, stroke code was called, NIHSS 5, received thrombolytic, now NIHSS 0. Of note, pt had same episode Oct 2022, given tpa, stroke w/u was negative and she was diagnosed with complex migraine, d/c off meds.     In the ED VS ICU Vital Signs Last 24 Hrs  T(C): 36.7 (30 Jan 2023 12:01), Max: 36.7 (30 Jan 2023 09:15)  T(F): 98.1 (30 Jan 2023 12:01), Max: 98.1 (30 Jan 2023 12:01)  HR: 85 (30 Jan 2023 13:31) (70 - 89)  BP: 121/75 (30 Jan 2023 13:31) (109/62 - 127/77)  BP(mean): 93 (30 Jan 2023 13:31) (86 - 93)  RR: 31 (30 Jan 2023 13:31) (18 - 35)  SpO2: 96% (30 Jan 2023 13:31) (96% - 100%)    O2 Parameters below as of 30 Jan 2023 12:01  Patient On (Oxygen Delivery Method): room air      Labs obtained prior to thrombolytic wnl. CT Brain Stroke Protocol No CT evidence of large acute territorial infarct. No evidence of acute intracranial pathology. No mass effect, midline shift or intracranial hemorrhage. CT Brain Perfusion No evidence of perfusion abnormality. CTA HEAD: No evidence of flow-limiting stenosis, occlusion or aneurysm. CTA NECK: No evidence of carotid or vertebral artery stenosis.    Admitted to MICU for stroke w/u

## 2023-01-30 NOTE — CONSULT NOTE ADULT - NS ATTEND AMEND GEN_ALL_CORE FT
Patient seen and examined and agree with above except as noted.  Patients history, notes, labs, imaging, vitals and meds reviewed personally.  Patient seen as stroke code last year s/p TPA  Likely complicated migraine however would repeat MRI brain to rule out stroke    Plan as above

## 2023-01-30 NOTE — ED PROVIDER NOTE - PHYSICAL EXAMINATION
CONSTITUTIONAL: NAD  SKIN: Warm dry  HEAD: NCAT  NEURO:   +RT upper and lower extremity weakness   +decreased sensation RT>>> (NIH +5)   PSYCH: Cooperative, appropriate.

## 2023-01-31 LAB
AMPHET UR-MCNC: NEGATIVE — SIGNIFICANT CHANGE UP
BARBITURATES UR SCN-MCNC: NEGATIVE — SIGNIFICANT CHANGE UP
BENZODIAZ UR-MCNC: NEGATIVE — SIGNIFICANT CHANGE UP
COCAINE METAB.OTHER UR-MCNC: NEGATIVE — SIGNIFICANT CHANGE UP
DRUG SCREEN 1, URINE RESULT: SIGNIFICANT CHANGE UP
FENTANYL UR QL: NEGATIVE — SIGNIFICANT CHANGE UP
METHADONE UR-MCNC: NEGATIVE — SIGNIFICANT CHANGE UP
OPIATES UR-MCNC: NEGATIVE — SIGNIFICANT CHANGE UP
OXYCODONE UR-MCNC: NEGATIVE — SIGNIFICANT CHANGE UP
PCP UR-MCNC: NEGATIVE — SIGNIFICANT CHANGE UP
PROPOXYPHENE QUALITATIVE URINE RESULT: NEGATIVE — SIGNIFICANT CHANGE UP
THC UR QL: NEGATIVE — SIGNIFICANT CHANGE UP

## 2023-01-31 PROCEDURE — 99233 SBSQ HOSP IP/OBS HIGH 50: CPT

## 2023-01-31 PROCEDURE — 70450 CT HEAD/BRAIN W/O DYE: CPT | Mod: 26

## 2023-01-31 PROCEDURE — 70551 MRI BRAIN STEM W/O DYE: CPT | Mod: 26

## 2023-01-31 PROCEDURE — 99232 SBSQ HOSP IP/OBS MODERATE 35: CPT

## 2023-01-31 RX ORDER — ONDANSETRON 8 MG/1
4 TABLET, FILM COATED ORAL EVERY 4 HOURS
Refills: 0 | Status: DISCONTINUED | OUTPATIENT
Start: 2023-01-31 | End: 2023-02-01

## 2023-01-31 NOTE — PROGRESS NOTE ADULT - ASSESSMENT
Patient is a 32 year old female with no hx presents with sudden onset RUE, RLE weakness and dysarthria, word finding difficulty. Patient was brought to ED, NIHSS 5, received thrombolytic, now NIHSS 0. Exam remarkable for RUE weakness with no drift. Patient had same episode Oct 2022, diagnosed with complex migraine as stroke workup was negative. Stroke imaging negative.    # r/o CVA  - CTH this AM, post thrombolytic protocol  - NIHSS 0 on exam  - TTE pending  - MRI brain non contrast  - REEG: normal

## 2023-01-31 NOTE — PROGRESS NOTE ADULT - ASSESSMENT
32 year old female with no hx presents with sudden onset RUE, RLE weakness and dysarthria, word finding difficulty admitted for suspected stroke, s/p cva.     IMPRESSION    Suspceted cva  Complext Migraine    PLAN:    CNS: Avoid Sedatives. S/p TNK NIHSS 0 on exam follow post-TNK orders  Neurology consult. Stroke w/u neg so far  sedation by XXXX, neurochecks Q1H. MRI brain w/o con. MARLEN. REEG. PT/OT/PMR/S&S    HEENT: Oral care, HOB at 45, speech and swallow evaluation    PULMONARY: Keep sat >92%    CARDIOVASCULAR: ECHO    GI: GI prophylaxis not needed. Tolerating PO    RENAL: I=0, monitor electrolytes and replete as needed    INFECTIOUS DISEASE: Monitor off Antibiotics    HEMATOLOGICAL:  No DVT prophylaxis, blood draws, gao or ngt    ENDOCRINE:  Follow up FS.  Insulin protocol if needed.    MUSCULOSKELETAL: Pt eval     TOXICOLOGY: Urine toxicology     33 yo F with PMHx complex migraine presents with sudden onset RUE, RLE weakness and dysarthria, word finding difficulty admitted for suspected stroke, s/p cva.     IMPRESSION    Suspceted cva  Complext Migraine    PLAN:    CNS: Avoid Sedatives. S/p TNK NIHSS 0 on exam. Neurology consult. Stroke w/u neg so far. neurochecks Q1H. MRI brain w/o con. MARLEN. REEG. PT/OT/PMR/S&S    HEENT: Oral care, HOB at 45, no need for speech and swallow evaluation    PULMONARY: Keep sat >92%    CARDIOVASCULAR: ECHO    GI: GI prophylaxis not needed. Tolerating PO    RENAL: I=0, monitor electrolytes and replete as needed    INFECTIOUS DISEASE: Monitor off Antibiotics    HEMATOLOGICAL:  No DVT prophylaxis, blood draws, gao or ngt    ENDOCRINE:  Follow up FS.  Insulin protocol if needed.    MUSCULOSKELETAL: Pt eval     TOXICOLOGY: Urine toxicology     31 yo F with PMHx complex migraine presents with sudden onset RUE, RLE weakness and dysarthria, word finding difficulty admitted for suspected stroke, s/p cva.     IMPRESSION    Suspceted cva  Complext Migraine    PLAN:    CNS: Avoid Sedatives. S/p TNK NIHSS 0 on exam. Neurology consult. Stroke w/u neg so far. neurochecks Q1H. MRI brain w/o con. MARLEN. REEG. PT/OT    HEENT: Oral care, HOB at 45, no need for speech and swallow evaluation    PULMONARY: Keep sat >92%    CARDIOVASCULAR: ECHO    GI: GI prophylaxis not needed. Tolerating regular PO diet1    RENAL: I=0, monitor electrolytes and replete as needed    INFECTIOUS DISEASE: Monitor off Antibiotics    HEMATOLOGICAL: Can start DVT prophylaxis, and blood draws today    ENDOCRINE:  Follow up FS.  Insulin protocol if needed.    MUSCULOSKELETAL: Pt eval     TOXICOLOGY: Urine toxicology     33 yo F with PMHx complex migraine presents with sudden onset RUE, RLE weakness and dysarthria, word finding difficulty admitted for suspected stroke, s/p cva.     IMPRESSION    Suspceted cva  Complext Migraine    PLAN:    CNS: Avoid Sedatives. S/p TNK NIHSS 0 on exam. Neurology consult. Stroke w/u neg so far. neurochecks Q1H. repeat CTH and MRI brain w/o con today. echo REEG was normal. PT/OT    HEENT: Oral care, HOB at 45, no need for speech and swallow evaluation    PULMONARY: Keep sat >92%    CARDIOVASCULAR: ECHO    GI: GI prophylaxis not needed. Tolerating regular PO diet    RENAL: I=0, monitor electrolytes and replete as needed    INFECTIOUS DISEASE: Monitor off Antibiotics    HEMATOLOGICAL: Can start DVT prophylaxis, and blood draws today    ENDOCRINE:  Follow up FS.  Insulin protocol if needed.    MUSCULOSKELETAL: Pt eval     TOXICOLOGY: Urine toxicology

## 2023-01-31 NOTE — PROGRESS NOTE ADULT - NS ATTEND AMEND GEN_ALL_CORE FT
Patient seen and examined and agree with above except as noted.  Patients history, notes, labs, imaging, vitals and meds reviewed personally.  If MRI brain negative the likely complicated migraine and can be discharged home with neurology follow up for possible migraine treatment     Plan as above

## 2023-01-31 NOTE — PROGRESS NOTE ADULT - SUBJECTIVE AND OBJECTIVE BOX
SUBJECTIVE:    Patient is a 32y old Female who presents with a chief complaint of stroke code (31 Jan 2023 10:46)    Currently admitted to medicine with the primary diagnosis of Arm weakness       Today is hospital day 1d. This morning she is resting comfortably in bed and reports no new issues or overnight events.     ROS:   CONSTITUTIONAL: No weakness, fevers or chills   EYES/ENT: No visual changes; No vertigo or throat pain   NECK: No pain or stiffness   RESPIRATORY: No cough, wheezing, hemoptysis; No shortness of breath   CARDIOVASCULAR: No chest pain or palpitations   GASTROINTESTINAL: No abdominal or epigastric pain. No nausea, vomiting, or hematemesis; No diarrhea or constipation. No melena or hematochezia.  GENITOURINARY: No dysuria, frequency or hematuria  NEUROLOGICAL: No numbness or weakness        PAST MEDICAL & SURGICAL HISTORY  Ovarian cyst    Miscarriage    Asthma  rare epsisodes- last use of albuterol 5yrs ago    H/O endoscopy    H/O breast augmentation      SOCIAL HISTORY:    ALLERGIES:  No Known Allergies    MEDICATIONS:  STANDING MEDICATIONS  influenza   Vaccine 0.5 milliLiter(s) IntraMuscular once  ondansetron    Tablet 4 milliGRAM(s) Oral every 6 hours    PRN MEDICATIONS  ondansetron Injectable 4 milliGRAM(s) IV Push every 4 hours PRN    VITALS:   T(F): 97  HR: 78  BP: 105/70  RR: 17  SpO2: 97%    LABS:  Negative for smoking/alcohol/drug use.                         13.3   7.78  )-----------( 260      ( 30 Jan 2023 09:15 )             38.4     01-30    137  |  105  |  15  ----------------------------<  89  4.2   |  21  |  0.8    Ca    9.8      30 Jan 2023 09:15    TPro  6.8  /  Alb  4.3  /  TBili  0.5  /  DBili  x   /  AST  20  /  ALT  20  /  AlkPhos  67  01-30    PT/INR - ( 30 Jan 2023 09:15 )   PT: 13.40 sec;   INR: 1.17 ratio         PTT - ( 30 Jan 2023 09:15 )  PTT:32.1 sec          CARDIAC MARKERS ( 30 Jan 2023 09:15 )  x     / <0.01 ng/mL / x     / x     / x          RADIOLOGY:    PHYSICAL EXAM:  GEN: No acute distress  HEENT: normocephalic, atraumatic, aniceteric  LUNGS: Clear to auscultation bilaterally, no rales/wheezing/ rhonchi  HEART: S1/S2 present. RRR, no murmurs  ABD: Soft, non-tender, non-distended. Bowel sounds present  EXT: no edema   NEURO: AAOX3, normal affect      ASSESSMENT AND PLAN:    32 year old female with no hx presents with sudden onset RUE, RLE weakness and dysarthria, word finding difficulty admitted for suspected stroke, s/p cva.       # RUE and RLE weakness and dysarthria sp thrombolytic , improved   # H/O Complex Migraine   # H/O similar presentation in 10/2022 with negative stroke w/up   - CT perfusion neg / CTAHN neg  - no SCD / AC / DVT PPX for 24 hrs post tnk  - no gao 24 hrs post tnk  - no ngt 24 hrs post tnk  - Q1H neurological checks   - MRI brain w/o contrast  - Repeat CTH today  - keep blood pressure < 180/105   - strict bedrest for 12 hrs post tnk  - Neurology eval appreciated      # DVT PPX : none at this time  # GI PPX: PPI  # Diet: per speech and swallow  # Activity: as tolerated   # Dispo: acute.

## 2023-01-31 NOTE — OCCUPATIONAL THERAPY INITIAL EVALUATION ADULT - SPECIFY REASON(S)
Chart reviewed. Attempted to see pt for bedside OT IE. As discussed with jacinto Danielle as pt is preparing for transport for MRI at [facility] at this time. OT to f/u when appropriate; RN aware.

## 2023-01-31 NOTE — PROGRESS NOTE ADULT - SUBJECTIVE AND OBJECTIVE BOX
Hospital Day:  1d    Subjective:    Patient is a 32y old  Female who presents with a chief complaint of stroke code (30 Jan 2023 13:13)      Admitted to medicine for a primary diagnosis of     Past Medical Hx:   Ovarian cyst    Miscarriage    Asthma    Acute ischemic stroke      Past Sx:  No significant past surgical history    H/O endoscopy    H/O breast augmentation      Allergies:  No Known Allergies    Current Meds:   Standng Meds:  influenza   Vaccine 0.5 milliLiter(s) IntraMuscular once  ondansetron    Tablet 4 milliGRAM(s) Oral every 6 hours    PRN Meds:  ondansetron Injectable 4 milliGRAM(s) IV Push every 4 hours PRN Nausea and/or Vomiting    HOME MEDICATIONS:      Vital Signs:   T(F): 97.3 (01-31-23 @ 07:10), Max: 99.4 (01-30-23 @ 19:06)  HR: 72 (01-31-23 @ 07:00) (65 - 101)  BP: 109/66 (01-31-23 @ 07:00) (89/53 - 127/77)  RR: 14 (01-31-23 @ 07:10) (14 - 41)  SpO2: 96% (01-31-23 @ 07:00) (94% - 100%)        Physical Exam:   GENERAL: NAD  HEENT: NCAT  CHEST/LUNG: CTAB  HEART: Regular rate and rhythm; s1 s2 appreciated, No murmurs, rubs, or gallops  ABDOMEN: Soft, Nontender, Nondistended; Bowel sounds present  EXTREMITIES: No LE edema b/l  SKIN: no rashes, no new lesions  NERVOUS SYSTEM:  Alert & Oriented X3  LINES/CATHETERS:        Labs:                         13.3   7.78  )-----------( 260      ( 30 Jan 2023 09:15 )             38.4     Neutophil% 79.0, Lymphocyte% 11.8, Monocyte% 6.9, Bands% 0.5 01-30-23 @ 09:15    30 Jan 2023 09:15    137    |  105    |  15     ----------------------------<  89     4.2     |  21     |  0.8      Ca    9.8        30 Jan 2023 09:15    TPro  6.8    /  Alb  4.3    /  TBili  0.5    /  DBili  x      /  AST  20     /  ALT  20     /  AlkPhos  67     30 Jan 2023 09:15       pTT    32.1             ----< 1.17 INR  (01-30-23 @ 09:15)    13.40        PT          Troponin <0.01, CKMB --, CK -- 01-30-23 @ 09:15                 Hospital Day:  1d    Subjective:    Patient is a 32y old  Female who presents with a chief complaint of stroke code. No overnight events. Feels well, no stroke symptoms.        Admitted to medicine for a primary diagnosis of suspected cva     Past Medical Hx:   Ovarian cyst    Miscarriage    Asthma    Acute ischemic stroke      Past Sx:  No significant past surgical history    H/O endoscopy    H/O breast augmentation      Allergies:  No Known Allergies    Current Meds:   Standng Meds:  influenza   Vaccine 0.5 milliLiter(s) IntraMuscular once  ondansetron    Tablet 4 milliGRAM(s) Oral every 6 hours    PRN Meds:  ondansetron Injectable 4 milliGRAM(s) IV Push every 4 hours PRN Nausea and/or Vomiting    HOME MEDICATIONS:      Vital Signs:   T(F): 97.3 (01-31-23 @ 07:10), Max: 99.4 (01-30-23 @ 19:06)  HR: 72 (01-31-23 @ 07:00) (65 - 101)  BP: 109/66 (01-31-23 @ 07:00) (89/53 - 127/77)  RR: 14 (01-31-23 @ 07:10) (14 - 41)  SpO2: 96% (01-31-23 @ 07:00) (94% - 100%)        Physical Exam:   GENERAL: NAD  HEENT: NCAT  CHEST/LUNG: CTAB  HEART: Regular rate and rhythm; s1 s2 appreciated, No murmurs, rubs, or gallops  ABDOMEN: Soft, Nontender, Nondistended; Bowel sounds present  EXTREMITIES: No LE edema b/l  SKIN: no rashes, no new lesions  NERVOUS SYSTEM:  Alert & Oriented X3        Labs:                         13.3   7.78  )-----------( 260      ( 30 Jan 2023 09:15 )             38.4     Neutophil% 79.0, Lymphocyte% 11.8, Monocyte% 6.9, Bands% 0.5 01-30-23 @ 09:15    30 Jan 2023 09:15    137    |  105    |  15     ----------------------------<  89     4.2     |  21     |  0.8      Ca    9.8        30 Jan 2023 09:15    TPro  6.8    /  Alb  4.3    /  TBili  0.5    /  DBili  x      /  AST  20     /  ALT  20     /  AlkPhos  67     30 Jan 2023 09:15       pTT    32.1             ----< 1.17 INR  (01-30-23 @ 09:15)    13.40        PT          Troponin <0.01, CKMB --, CK -- 01-30-23 @ 09:15                 Hospital Day:  1d    Subjective:    Patient is a 32y old  Female who presents with a chief complaint of stroke code. No overnight events. Feels well, no stroke symptoms. NIH0      Admitted to medicine for a primary diagnosis of suspected cva     Past Medical Hx:   Ovarian cyst    Miscarriage    Asthma    Acute ischemic stroke      Past Sx:  No significant past surgical history    H/O endoscopy    H/O breast augmentation      Allergies:  No Known Allergies    Current Meds:   Standng Meds:  influenza   Vaccine 0.5 milliLiter(s) IntraMuscular once  ondansetron    Tablet 4 milliGRAM(s) Oral every 6 hours    PRN Meds:  ondansetron Injectable 4 milliGRAM(s) IV Push every 4 hours PRN Nausea and/or Vomiting    HOME MEDICATIONS:      Vital Signs:   T(F): 97.3 (01-31-23 @ 07:10), Max: 99.4 (01-30-23 @ 19:06)  HR: 72 (01-31-23 @ 07:00) (65 - 101)  BP: 109/66 (01-31-23 @ 07:00) (89/53 - 127/77)  RR: 14 (01-31-23 @ 07:10) (14 - 41)  SpO2: 96% (01-31-23 @ 07:00) (94% - 100%)        Physical Exam:   GENERAL: NAD  HEENT: NCAT  CHEST/LUNG: CTAB  HEART: Regular rate and rhythm; s1 s2 appreciated, No murmurs, rubs, or gallops  ABDOMEN: Soft, Nontender, Nondistended; Bowel sounds present  EXTREMITIES: No LE edema b/l  SKIN: no rashes, no new lesions  NERVOUS SYSTEM:  Alert & Oriented X3        Labs:                         13.3   7.78  )-----------( 260      ( 30 Jan 2023 09:15 )             38.4     Neutophil% 79.0, Lymphocyte% 11.8, Monocyte% 6.9, Bands% 0.5 01-30-23 @ 09:15    30 Jan 2023 09:15    137    |  105    |  15     ----------------------------<  89     4.2     |  21     |  0.8      Ca    9.8        30 Jan 2023 09:15    TPro  6.8    /  Alb  4.3    /  TBili  0.5    /  DBili  x      /  AST  20     /  ALT  20     /  AlkPhos  67     30 Jan 2023 09:15       pTT    32.1             ----< 1.17 INR  (01-30-23 @ 09:15)    13.40        PT          Troponin <0.01, CKMB --, CK -- 01-30-23 @ 09:15

## 2023-01-31 NOTE — PROGRESS NOTE ADULT - SUBJECTIVE AND OBJECTIVE BOX
Neurology Follow up note    Name  SAWYER UNDERWOOD    HPI:  31 yo F with PMHx complex migraine presents with sudden onset RUE, RLE weakness and dysarthria, word finding difficulty since 7:40 today.    In the ED, stroke code was called, NIHSS 5, received thrombolytic, now NIHSS 0. Of note, pt had same episode Oct 2022, given tpa, stroke w/u was negative and she was diagnosed with complex migraine, d/c off meds.     Labs obtained prior to thrombolytic wnl. CT Brain Stroke Protocol No CT evidence of large acute territorial infarct. No evidence of acute intracranial pathology. No mass effect, midline shift or intracranial hemorrhage. CT Brain Perfusion No evidence of perfusion abnormality. CTA HEAD: No evidence of flow-limiting stenosis, occlusion or aneurysm. CTA NECK: No evidence of carotid or vertebral artery stenosis.        Interval History - no events overnight, patient feels well          Vital Signs Last 24 Hrs  T(C): 36.3 (31 Jan 2023 07:10), Max: 37.4 (30 Jan 2023 19:06)  T(F): 97.3 (31 Jan 2023 07:10), Max: 99.4 (30 Jan 2023 19:06)  HR: 78 (31 Jan 2023 10:00) (64 - 101)  BP: 112/70 (31 Jan 2023 10:00) (89/53 - 127/77)  BP(mean): 86 (31 Jan 2023 10:00) (65 - 97)  RR: 31 (31 Jan 2023 10:00) (14 - 41)  SpO2: 97% (31 Jan 2023 09:01) (94% - 100%)    Parameters below as of 31 Jan 2023 09:01  Patient On (Oxygen Delivery Method): room air          Neurological Exam:   Mental status: Awake, alert and oriented x3.  Recent and remote memory intact.  Naming, repetition and comprehension intact.  Attention/concentration intact.  No dysarthria, no aphasia.  Fund of knowledge appropriate.    Cranial nerves: pupils equally round and reactive to light, visual fields full, no nystagmus, extraocular muscles intact, V1 through V3 intact bilaterally and symmetric, face symmetric, hearing intact to finger rub, palate elevation symmetric, tongue was midline, sternocleidomastoid/shoulder shrug strength bilaterally 5/5.    Motor:  Normal bulk and tone, strength 5/5 in bilateral upper and lower extremities.   strength 5/5.  Rapid alternating movements intact and symmetric.   Sensation: Intact to light touch, proprioception, and pinprick.  No neglect.   Coordination: No dysmetria on finger-to-nose and heel-to-shin.  No clumsiness.  Reflexes: 2+ in upper and lower extremities, downgoing toes bilaterally  Gait: Narrow and steady. No ataxia.  Romberg negative    NIHSS 0    Medications  influenza   Vaccine 0.5 milliLiter(s) IntraMuscular once  ondansetron    Tablet 4 milliGRAM(s) Oral every 6 hours  ondansetron Injectable 4 milliGRAM(s) IV Push every 4 hours PRN      Lab                        13.3   7.78  )-----------( 260      ( 30 Jan 2023 09:15 )             38.4     01-30    137  |  105  |  15  ----------------------------<  89  4.2   |  21  |  0.8    Ca    9.8      30 Jan 2023 09:15    TPro  6.8  /  Alb  4.3  /  TBili  0.5  /  DBili  x   /  AST  20  /  ALT  20  /  AlkPhos  67  01-30    CAPILLARY BLOOD GLUCOSE        LIVER FUNCTIONS - ( 30 Jan 2023 09:15 )  Alb: 4.3 g/dL / Pro: 6.8 g/dL / ALK PHOS: 67 U/L / ALT: 20 U/L / AST: 20 U/L / GGT: x           PT/INR - ( 30 Jan 2023 09:15 )   PT: 13.40 sec;   INR: 1.17 ratio         PTT - ( 30 Jan 2023 09:15 )  PTT:32.1 sec

## 2023-02-01 ENCOUNTER — TRANSCRIPTION ENCOUNTER (OUTPATIENT)
Age: 33
End: 2023-02-01

## 2023-02-01 VITALS — HEART RATE: 68 BPM | DIASTOLIC BLOOD PRESSURE: 76 MMHG | SYSTOLIC BLOOD PRESSURE: 126 MMHG | RESPIRATION RATE: 20 BRPM

## 2023-02-01 LAB
ALBUMIN SERPL ELPH-MCNC: 4.1 G/DL — SIGNIFICANT CHANGE UP (ref 3.5–5.2)
ALP SERPL-CCNC: 68 U/L — SIGNIFICANT CHANGE UP (ref 30–115)
ALT FLD-CCNC: 18 U/L — SIGNIFICANT CHANGE UP (ref 0–41)
ANION GAP SERPL CALC-SCNC: 8 MMOL/L — SIGNIFICANT CHANGE UP (ref 7–14)
AST SERPL-CCNC: 18 U/L — SIGNIFICANT CHANGE UP (ref 0–41)
BASOPHILS # BLD AUTO: 0.02 K/UL — SIGNIFICANT CHANGE UP (ref 0–0.2)
BASOPHILS NFR BLD AUTO: 0.4 % — SIGNIFICANT CHANGE UP (ref 0–1)
BILIRUB SERPL-MCNC: 0.2 MG/DL — SIGNIFICANT CHANGE UP (ref 0.2–1.2)
BUN SERPL-MCNC: 9 MG/DL — LOW (ref 10–20)
CALCIUM SERPL-MCNC: 9.4 MG/DL — SIGNIFICANT CHANGE UP (ref 8.4–10.5)
CHLORIDE SERPL-SCNC: 105 MMOL/L — SIGNIFICANT CHANGE UP (ref 98–110)
CO2 SERPL-SCNC: 27 MMOL/L — SIGNIFICANT CHANGE UP (ref 17–32)
CREAT SERPL-MCNC: 0.7 MG/DL — SIGNIFICANT CHANGE UP (ref 0.7–1.5)
EGFR: 116 ML/MIN/1.73M2 — SIGNIFICANT CHANGE UP
EOSINOPHIL # BLD AUTO: 0.14 K/UL — SIGNIFICANT CHANGE UP (ref 0–0.7)
EOSINOPHIL NFR BLD AUTO: 3 % — SIGNIFICANT CHANGE UP (ref 0–8)
FOLATE SERPL-MCNC: 6.8 NG/ML — SIGNIFICANT CHANGE UP
GLUCOSE SERPL-MCNC: 81 MG/DL — SIGNIFICANT CHANGE UP (ref 70–99)
HCT VFR BLD CALC: 38.9 % — SIGNIFICANT CHANGE UP (ref 37–47)
HGB BLD-MCNC: 13 G/DL — SIGNIFICANT CHANGE UP (ref 12–16)
IMM GRANULOCYTES NFR BLD AUTO: 0.4 % — HIGH (ref 0.1–0.3)
LYMPHOCYTES # BLD AUTO: 1.45 K/UL — SIGNIFICANT CHANGE UP (ref 1.2–3.4)
LYMPHOCYTES # BLD AUTO: 31.4 % — SIGNIFICANT CHANGE UP (ref 20.5–51.1)
MAGNESIUM SERPL-MCNC: 1.9 MG/DL — SIGNIFICANT CHANGE UP (ref 1.8–2.4)
MCHC RBC-ENTMCNC: 30.3 PG — SIGNIFICANT CHANGE UP (ref 27–31)
MCHC RBC-ENTMCNC: 33.4 G/DL — SIGNIFICANT CHANGE UP (ref 32–37)
MCV RBC AUTO: 90.7 FL — SIGNIFICANT CHANGE UP (ref 81–99)
MONOCYTES # BLD AUTO: 0.56 K/UL — SIGNIFICANT CHANGE UP (ref 0.1–0.6)
MONOCYTES NFR BLD AUTO: 12.1 % — HIGH (ref 1.7–9.3)
NEUTROPHILS # BLD AUTO: 2.43 K/UL — SIGNIFICANT CHANGE UP (ref 1.4–6.5)
NEUTROPHILS NFR BLD AUTO: 52.7 % — SIGNIFICANT CHANGE UP (ref 42.2–75.2)
NRBC # BLD: 0 /100 WBCS — SIGNIFICANT CHANGE UP (ref 0–0)
PLATELET # BLD AUTO: 252 K/UL — SIGNIFICANT CHANGE UP (ref 130–400)
POTASSIUM SERPL-MCNC: 4.5 MMOL/L — SIGNIFICANT CHANGE UP (ref 3.5–5)
POTASSIUM SERPL-SCNC: 4.5 MMOL/L — SIGNIFICANT CHANGE UP (ref 3.5–5)
PROT SERPL-MCNC: 6.7 G/DL — SIGNIFICANT CHANGE UP (ref 6–8)
RBC # BLD: 4.29 M/UL — SIGNIFICANT CHANGE UP (ref 4.2–5.4)
RBC # FLD: 12.9 % — SIGNIFICANT CHANGE UP (ref 11.5–14.5)
SODIUM SERPL-SCNC: 140 MMOL/L — SIGNIFICANT CHANGE UP (ref 135–146)
TSH SERPL-MCNC: 2.57 UIU/ML — SIGNIFICANT CHANGE UP (ref 0.27–4.2)
VIT B12 SERPL-MCNC: 507 PG/ML — SIGNIFICANT CHANGE UP (ref 232–1245)
WBC # BLD: 4.62 K/UL — LOW (ref 4.8–10.8)
WBC # FLD AUTO: 4.62 K/UL — LOW (ref 4.8–10.8)

## 2023-02-01 PROCEDURE — 93306 TTE W/DOPPLER COMPLETE: CPT | Mod: 26

## 2023-02-01 PROCEDURE — 99239 HOSP IP/OBS DSCHRG MGMT >30: CPT

## 2023-02-01 RX ADMIN — ONDANSETRON 4 MILLIGRAM(S): 8 TABLET, FILM COATED ORAL at 12:16

## 2023-02-01 NOTE — DISCHARGE NOTE PROVIDER - HOSPITAL COURSE
32 year old female with no hx presents with sudden onset RUE, RLE weakness and dysarthria, word finding difficulty admitted for suspected stroke, s/p cva.       # RUE and RLE weakness and dysarthria sp thrombolytic , improved   # H/O Complex Migraine   # H/O similar presentation in 10/2022 with negative stroke w/up   - CT perfusion neg / CTAHN neg  - no SCD / AC / DVT PPX for 24 hrs post tnk  - no gao 24 hrs post tnk  - no ngt 24 hrs post tnk  - Q1H neurological checks   - MRI brain w/o contrast  - Repeat CTH no hemorrhage post tnk  - MRI brain negative for stroke  - Neurology eval appreciated given neg mri , op fu    attending attestation: seen and examined on day of discharge, ros negative, vitals and labs stable, dc home with op neurology fu 32 year old female with no hx presents with sudden onset RUE, RLE weakness and dysarthria, word finding difficulty admitted for suspected stroke, s/p cva.       # RUE and RLE weakness and dysarthria sp thrombolytic 2/2 possible complex migraine   # H/O Complex Migraine   # H/O similar presentation in 10/2022 with negative stroke w/up   - CT perfusion neg / CTAHN neg  - no SCD / AC / DVT PPX for 24 hrs post tnk  - no gao 24 hrs post tnk  - no ngt 24 hrs post tnk  - Q1H neurological checks   - MRI brain w/o contrast negative for CVA   - Repeat CTH no hemorrhage post tnk  - MRI brain negative for stroke  - Neurology eval appreciated given neg mri , op fu    attending attestation: seen and examined on day of discharge, ros negative, vitals and labs stable, dc home with op neurology fu

## 2023-02-01 NOTE — OCCUPATIONAL THERAPY INITIAL EVALUATION ADULT - LEVEL OF INDEPENDENCE: SHOWER, REHAB EVAL
However patient advised to have family member present in the beginning. Pt verbalized good understanding./independent

## 2023-02-01 NOTE — PROGRESS NOTE ADULT - SUBJECTIVE AND OBJECTIVE BOX
Hospital Day:  2d    Subjective:    Patient is a 32y old  Female who presents with a chief complaint of stroke code (31 Jan 2023 10:46)      Admitted to medicine for a primary diagnosis of     Past Medical Hx:   Ovarian cyst    Miscarriage    Asthma    Acute ischemic stroke      Past Sx:  No significant past surgical history    H/O endoscopy    H/O breast augmentation      Allergies:  No Known Allergies    Current Meds:   Standng Meds:  influenza   Vaccine 0.5 milliLiter(s) IntraMuscular once  ondansetron    Tablet 4 milliGRAM(s) Oral every 6 hours    PRN Meds:  ondansetron Injectable 4 milliGRAM(s) IV Push every 4 hours PRN Nausea and/or Vomiting    HOME MEDICATIONS:      Vital Signs:   T(F): 97.1 (02-01-23 @ 07:01), Max: 97.6 (01-31-23 @ 17:46)  HR: 58 (02-01-23 @ 05:35) (58 - 82)  BP: 106/56 (02-01-23 @ 05:35) (96/60 - 116/75)  RR: 16 (02-01-23 @ 07:01) (15 - 37)  SpO2: 98% (02-01-23 @ 01:10) (96% - 98%)      01-31-23 @ 07:01  -  02-01-23 @ 07:00  --------------------------------------------------------  IN: 0 mL / OUT: 201 mL / NET: -201 mL        Physical Exam:   GENERAL: NAD  HEENT: NCAT  CHEST/LUNG: CTAB  HEART: Regular rate and rhythm; s1 s2 appreciated, No murmurs, rubs, or gallops  ABDOMEN: Soft, Nontender, Nondistended; Bowel sounds present  EXTREMITIES: No LE edema b/l  SKIN: no rashes, no new lesions  NERVOUS SYSTEM:  Alert & Oriented X3  LINES/CATHETERS:        Labs:                         13.3   7.78  )-----------( 260      ( 30 Jan 2023 09:15 )             38.4       30 Jan 2023 09:15    137    |  105    |  15     ----------------------------<  89     4.2     |  21     |  0.8      Ca    9.8        30 Jan 2023 09:15    TPro  6.8    /  Alb  4.3    /  TBili  0.5    /  DBili  x      /  AST  20     /  ALT  20     /  AlkPhos  67     30 Jan 2023 09:15              Troponin <0.01, CKMB --, CK -- 01-30-23 @ 09:15

## 2023-02-01 NOTE — PHYSICAL THERAPY INITIAL EVALUATION ADULT - ASSISTIVE DEVICE FOR TRANSFER: STAND/SIT, REHAB EVAL
[address] [place] [address] [facility]  [facility]  Progress Note    SUBJECTIVE:  CC: left sided back pain    Patient is tolerating medications. No reported adverse drug reactions. No nausea, vomiting, diarrhea. Laying in bed, feeling better today  Hoping to go home today   Afebrile overnight     Review of systems:  As stated above in the chief complaint, otherwise negative. Medications:  Scheduled Meds:   amphetamine-dextroamphetamine  20 mg Oral Daily    ARIPiprazole  2 mg Oral Daily    atorvastatin  40 mg Oral Nightly    DULoxetine  60 mg Oral BID    gabapentin  300 mg Oral BID    hydroxychloroquine  200 mg Oral BID    levothyroxine  100 mcg Oral Daily    losartan  50 mg Oral Daily    traZODone  100 mg Oral Nightly    sodium chloride flush  5-40 mL IntraVENous 2 times per day    enoxaparin  40 mg SubCUTAneous Daily    busPIRone  15 mg Oral BID    pantoprazole  40 mg Oral QAM AC    lactulose  20 g Oral BID    cefepime  2,000 mg IntraVENous Q8H    insulin lispro  0-10 Units SubCUTAneous 4x Daily AC & HS     Continuous Infusions:   sodium chloride 125 mL/hr at 10/14/22 0600    sodium chloride      dextrose       PRN Meds:sodium chloride flush, sodium chloride, ondansetron **OR** ondansetron, acetaminophen **OR** acetaminophen, magnesium hydroxide, HYDROcodone 5 mg - acetaminophen, glucose, dextrose bolus **OR** dextrose bolus, glucagon (rDNA), dextrose    OBJECTIVE:  BP (!) 141/72   Pulse 91   Temp 97.8 °F (36.6 °C) (Oral)   Resp 18   Ht 5' 4\" (1.626 m)   Wt 171 lb (77.6 kg)   SpO2 100%   BMI 29.35 kg/m²   Temp  Av.2 °F (36.8 °C)  Min: 97.8 °F (36.6 °C)  Max: 98.8 °F (37.1 °C)  Constitutional: The patient is awake, alert, and oriented. In bed. In no distress. Skin: Warm and dry. No rashes were noted. HEENT: Round and reactive pupils. Moist mucous membranes. No ulcerations or thrush. Neck: Supple to movements. Chest: No use of accessory muscles to breathe. Symmetrical expansion.   No wheezing, crackles or rhonchi. Cardiovascular: S1 and S2 are rhythmic and regular. No murmurs appreciated. Abdomen: Positive bowel sounds to auscultation. Benign to palpation. Left sided costovertebral angle tenderness- resolved   Extremities: No clubbing, no cyanosis, no edema. Lines: peripheral    Laboratory and Tests Review:  Lab Results   Component Value Date    WBC 11.1 10/13/2022    WBC 14.2 (H) 10/12/2022    WBC 15.8 (H) 10/11/2022    HGB 11.8 10/13/2022    HCT 34.6 10/13/2022    MCV 92.8 10/13/2022     10/13/2022     Lab Results   Component Value Date    NEUTROABS 8.94 (H) 10/13/2022    NEUTROABS 12.64 (H) 10/12/2022    NEUTROABS 13.68 (H) 10/11/2022     No results found for: [facility]  Lab Results   Component Value Date    ALT 5 10/11/2022    AST 22 10/11/2022    ALKPHOS 105 (H) 10/11/2022    BILITOT 1.2 10/11/2022     Lab Results   Component Value Date/Time     10/14/2022 03:46 AM    K 3.6 10/14/2022 03:46 AM    K 4.2 10/11/2022 04:50 PM     10/14/2022 03:46 AM    CO2 24 10/14/2022 03:46 AM    BUN 4 10/14/2022 03:46 AM    CREATININE 0.8 10/14/2022 03:46 AM    CREATININE 0.9 10/13/2022 04:55 AM    CREATININE 1.1 10/12/2022 11:55 AM    GFRAA >60 10/14/2022 03:46 AM    LABGLOM >60 10/14/2022 03:46 AM    GLUCOSE 164 10/14/2022 03:46 AM    PROT 7.6 10/11/2022 04:50 PM    LABALBU 4.1 10/11/2022 04:50 PM    CALCIUM 8.6 10/14/2022 03:46 AM    BILITOT 1.2 10/11/2022 04:50 PM    ALKPHOS 105 10/11/2022 04:50 PM    AST 22 10/11/2022 04:50 PM    ALT 5 10/11/2022 04:50 PM     Lab Results   Component Value Date    CRP 0.1 02/04/2016    CRP 0.8 (H) 01/09/2016     Lab Results   Component Value Date    SEDRATE 12 02/04/2016    SEDRATE 11 01/09/2016     Radiology:      Microbiology:   UA showed pyuria,   Urine Culture 10/11/22: E. Coli (pan sensitive)     ASSESSMENT:  Left sided pyelonephritis: blood cx were not done on admission, they won't be any useful at this point.    No prior urine cx in the system. Leucocytosis: improving     PLAN:  Continue Cefepime 2gr q 8 IV while inpatient  Will discharge on oral Levaquin 500mg for total 7 days  Monitor labs  Okay to DC from ID POV, script in chart     JOSEPH Peralta - CNP  9:18 AM  10/14/2022     Pt seen and examined. Above discussed agree with advanced practice nurse. Labs, cultures, and radiographs reviewed. Face to Face encounter occurred. Changes made as necessary.      Kecia Sweet MD without AD

## 2023-02-01 NOTE — OCCUPATIONAL THERAPY INITIAL EVALUATION ADULT - LIVES WITH, PROFILE
, 2 children, 1 dog in a private house; 3 steps to enter no handrails; 1 flight to the bedroom; +walk in shower +bathtub/children/spouse

## 2023-02-01 NOTE — PHYSICAL THERAPY INITIAL EVALUATION ADULT - PERTINENT HX OF CURRENT PROBLEM, REHAB EVAL
32 year old female with no hx presents with sudden onset RUE, RLE weakness and dysarthria, word finding difficulty admitted for suspected stroke, s/p cva.   As per MRI as of 3/31 there is no acute stroke identified

## 2023-02-01 NOTE — OCCUPATIONAL THERAPY INITIAL EVALUATION ADULT - PERTINENT HX OF CURRENT PROBLEM, REHAB EVAL
33 yo F with PMHx complex migraine presents with sudden onset RUE, RLE weakness and dysarthria, word finding difficulty since 7:40 today.    In the ED, stroke code was called, NIHSS 5, received thrombolytic, now NIHSS 0. Of note, pt had same episode Oct 2022, given tpa, stroke w/u was negative and she was diagnosed with complex migraine, d/c off meds.     MRI 1/31: Unremarkable MRI of the brain. Stable exam.

## 2023-02-01 NOTE — OCCUPATIONAL THERAPY INITIAL EVALUATION ADULT - NSOTDISCHREC_GEN_A_CORE
at this time. Out Patient OT PRN if patient notices a decrease in dominant hand strength/No skilled OT needs

## 2023-02-01 NOTE — DISCHARGE NOTE PROVIDER - NSDCCPCAREPLAN_GEN_ALL_CORE_FT
PRINCIPAL DISCHARGE DIAGNOSIS  Diagnosis: Arm weakness  Assessment and Plan of Treatment:       SECONDARY DISCHARGE DIAGNOSES  Diagnosis: Complicated migraine  Assessment and Plan of Treatment:      PRINCIPAL DISCHARGE DIAGNOSIS  Diagnosis: Arm weakness  Assessment and Plan of Treatment: you presented with arm weakness and stroke was ruled out, you received tnk when stroke was suspected. MRI brain did not show a stroke. EEG was negative for seizure.   your symptoms are possibly due to complex migraine   you will need to see neurologist outpatient for further management of these symptoms

## 2023-02-01 NOTE — DISCHARGE NOTE PROVIDER - NSDCFUADDAPPT_GEN_ALL_CORE_FT
APPTS ARE READY TO BE MADE: [ ] YES    Best Family or Patient Contact (if needed):    Additional Information about above appointments (if needed):    1: Neurology Dr. Santos Martinez within 2 weeks   2:   3:     Other comments or requests: please schedule patient for an appointment sooner than later thank you

## 2023-02-01 NOTE — OCCUPATIONAL THERAPY INITIAL EVALUATION ADULT - VISUAL ASSESSMENT: CONVERGENCE
Right eye came to midline and the exo phoria; Pt reported having lazy eye as child however couldn't remember which eye/left only

## 2023-02-01 NOTE — OCCUPATIONAL THERAPY INITIAL EVALUATION ADULT - KINESTHESIA, RUE, OT EVAL
DOCUMENT CREATED: 2019  1427h  NAME: William Sharpe (Boy)  CLINIC NUMBER: [id number]  ADMITTED: 2019  HOSPITAL NUMBER: [id number]  BIRTH WEIGHT: 3.300 kg (43.3 percentile)  GESTATIONAL AGE AT BIRTH: 39 0 days  DATE OF SERVICE: 2019     AGE: 17 days. POSTMENSTRUAL AGE: 41 weeks 3 days. CURRENT WEIGHT: 3.505 kg (Down   10gm) (7 lb 12 oz) (32.3 percentile). WEIGHT GAIN: 14 gm/kg/day in the past   week.        VITAL SIGNS & PHYSICAL EXAM  WEIGHT: 3.505kg (32.3 percentile)  OVERALL STATUS: Noncritical - low complexity. BED: Crib. TEMP: 97.7-98.6. HR:   130-199. RR: 26-56. BP: 80//55  URINE OUTPUT: Stable. STOOL: 3.  HEENT: Normocephalic and soft and flat fontanelle.  RESPIRATORY: Good air exchange and clear breath sounds bilaterally.  CARDIAC: Normal sinus rhythm and no murmur.  ABDOMEN: Good bowel sounds, soft abdomen and transverse abdominal incision   intact and covered by steri strips, no erythema.  : Normal term male features.  NEUROLOGIC: Good tone and activity level.  EXTREMITIES: Moves all extremities well and left arm PIV in place.  SKIN: Clear, pink.     NEW FLUID INTAKE  Based on 3.505kg. All IV constituents in mEq/kg unless otherwise specified.  TPN-PIV: C (D10W) standard solution  FEEDS: Human Milk - Term 20 kcal/oz 50ml Orally q3h  for 12h  FEEDS: Human Milk - Term 20 kcal/oz 60ml Orally q3h  for 12h  INTAKE OVER PAST 24 HOURS: 153ml/kg/d. OUTPUT OVER PAST 24 HOURS: 5.1ml/kg/hr.   TOLERATING FEEDS: Well. ORAL FEEDS: All feedings. TOLERATING ORAL FEEDS: Well.   COMMENTS: On breast milk at 85 ml/kg and TPN, fluid goal 150 ml/kg/day. Lost   weight, stooling. Tolerating advancement of feedings well. No emesis. PLANS:   Advance feedings x2 and complete TPN today.     CURRENT MEDICATIONS  Multivitamins with iron 0.5 ml started on 2019 (completed 1 days)     RESPIRATORY SUPPORT  SUPPORT: Room air     CURRENT PROBLEMS & DIAGNOSES  TERM  ONSET: 2019  STATUS: Active  COMMENTS: 17 days  old, 41 3/7 weeks corrected age. Stable temperatures in open   crib. Lost weight. Tolerating advancement of feedings well. On multivitamin with   iron.  PLANS: Continue developmentally appropriate care. See fluids section. CMP and   heme labs on .  DUODENAL STENOSIS  ONSET: 2019  STATUS: Active  PROCEDURES: Echocardiogram on 2019 (Normally connected heart., PFO with a   bidirectional shunt., No ventricular or ductal level shunting., Normal   biventricular size and systolic function., Systolic flattening of the   ventricular septum as is normal in a child of this age., No pericardial   effusion.); Abdominal ultrasound on 2019 (unremarkable); Laparotomy on   2019 (Dr Mercado performed exploratory lap with duodenojejunostomy,   correction of malrotation; appendectomy).  COMMENTS:  exploratory lap with duodenojejunostomy and appendectomy.   Feedings initiated on  and infant is tolerating well.  PLANS: Continue to advance feedings. Follow with peds surgery.     TRACKING   SCREENING: Last study on 2019: Pending.  FURTHER SCREENING: Hearing screen indicated.  SOCIAL COMMENTS:  mom and MGM updated during rounds. Discharge in the near   future discussed.  IMMUNIZATIONS & PROPHYLAXES: Hepatitis B on 2019.     NOTE CREATORS  DAILY ATTENDING: Romy Brewster MD  PREPARED BY: Romy Brewster MD                 Electronically Signed by Romy Brewster MD on 2019 [id number].            within normal limits

## 2023-02-01 NOTE — PHYSICAL THERAPY INITIAL EVALUATION ADULT - ADDITIONAL COMMENTS
pt lives with her  and kids in a private house with 3 steps to enter without rails and 1 flight to bedroom and bathroom area inside with rails.  pt was independent community ambulator prior to admission without AD, pt is an active

## 2023-02-01 NOTE — OCCUPATIONAL THERAPY INITIAL EVALUATION ADULT - RANGE OF MOTION EXAMINATION, UPPER EXTREMITY
Left UE Active ROM was WNL (within normal limits)/Left UE Passive ROM was WNL (within normal limits)/Right UE Active ROM was WNL (within normal limits)/Right UE Passive ROM was WNL (within normal limits)

## 2023-02-01 NOTE — DISCHARGE NOTE PROVIDER - CARE PROVIDER_API CALL
George Ramos)  EEGEpilepsy; Neurology  [address] [place] [address], [address]  [place] [address]  Phone: [phone]  Fax: [phone]  Follow Up Time:

## 2023-02-01 NOTE — OCCUPATIONAL THERAPY INITIAL EVALUATION ADULT - GENERAL OBSERVATIONS, REHAB EVAL
09:40-10:05 Chart reviewed, ok to treat by Occupational Therapist as confirmed by RN Belinda, Pt received semi-turner's in bed +Heplock left arm +tele in NAD. Pt in agreement with OT IE.

## 2023-02-01 NOTE — DISCHARGE NOTE NURSING/CASE MANAGEMENT/SOCIAL WORK - NSDCFUADDAPPT_GEN_ALL_CORE_FT
APPTS ARE READY TO BE MADE: [ ] YES    Best Family or Patient Contact (if needed):    Additional Information about above appointments (if needed):    1: Neurology Dr. Santos Maritnez within 2 weeks   2:   3:     Other comments or requests: please schedule patient for an appointment sooner than later thank you

## 2023-02-01 NOTE — PHYSICAL THERAPY INITIAL EVALUATION ADULT - GENERAL OBSERVATIONS, REHAB EVAL
10;50-11;10 pt was seen for PT IE at bed side, pt is agreeable, chart thoroughly reviewed, RN Belinda is aware. Pt received and left semi turner in bed, in no apparent distress, +telemonitoring, +BP cuff and +pulse ox, +hep lock, +call bell within reach, bed side table at reach

## 2023-02-01 NOTE — PROGRESS NOTE ADULT - ASSESSMENT
31 yo F with PMHx complex migraine presents with sudden onset RUE, RLE weakness and dysarthria, word finding difficulty admitted for suspected stroke.     IMPRESSION    Suspected cva  Complex Migraine    PLAN:    CNS: Avoid Sedatives. S/p TNK NIHSS 0 on exam. Neurology following. Stroke w/u neg so far. neurochecks Q4H. repeat CTH and MRI brain unremarkable. echo pending. REEG was normal. PT/OT    HEENT: Oral care, HOB at 45, no need for speech and swallow evaluation    PULMONARY: Keep sat >92%    CARDIOVASCULAR: F/u ECHO    GI: GI prophylaxis not needed. Tolerating regular PO diet    RENAL: I=0, monitor electrolytes and replete as needed    INFECTIOUS DISEASE: Monitor off Antibiotics    HEMATOLOGICAL: Can start DVT prophylaxis, and blood draws today    ENDOCRINE:  Follow up FS.  Insulin protocol if needed.    MUSCULOSKELETAL: Pt eval     TOXICOLOGY: Urine toxicology neg

## 2023-02-01 NOTE — DISCHARGE NOTE NURSING/CASE MANAGEMENT/SOCIAL WORK - PATIENT PORTAL LINK FT
You can access the FollowMyHealth Patient Portal offered by [facility] by registering at the following website: [email or web address]. By joining [employer]’s FollowMyHealth portal, you will also be able to view your health information using other applications (apps) compatible with our system.

## 2023-02-06 DIAGNOSIS — G43.809 OTHER MIGRAINE, NOT INTRACTABLE, WITHOUT STATUS MIGRAINOSUS: ICD-10-CM

## 2023-02-06 DIAGNOSIS — R29.898 OTHER SYMPTOMS AND SIGNS INVOLVING THE MUSCULOSKELETAL SYSTEM: ICD-10-CM

## 2023-02-06 DIAGNOSIS — R47.1 DYSARTHRIA AND ANARTHRIA: ICD-10-CM

## 2023-02-09 ENCOUNTER — APPOINTMENT (OUTPATIENT)
Dept: NEUROLOGY | Facility: CLINIC | Age: 33
End: 2023-02-09

## 2023-07-05 NOTE — PATIENT PROFILE ADULT - IS PATIENT POST-MENOPAUSAL?
Ms. Gonzalez called and made aware that patient has outstanding lab work.  Advised that she needs to fast at least 8 hours prior to blood draw.  Patient states that she will go to lab this week  
no

## 2023-09-22 NOTE — H&P PST ADULT - PSYCHIATRIC
Affect and characteristics of appearance, verbalizations, behaviors are appropriate
hard copy, drawn during this pregnancy

## 2024-01-10 ENCOUNTER — APPOINTMENT (OUTPATIENT)
Dept: SURGERY | Facility: CLINIC | Age: 34
End: 2024-01-10

## 2024-01-24 ENCOUNTER — APPOINTMENT (OUTPATIENT)
Dept: SURGERY | Facility: CLINIC | Age: 34
End: 2024-01-24
Payer: COMMERCIAL

## 2024-01-24 VITALS — WEIGHT: 203 LBS | BODY MASS INDEX: 29.06 KG/M2 | HEIGHT: 70 IN

## 2024-01-24 DIAGNOSIS — E66.09 OTHER OBESITY DUE TO EXCESS CALORIES: ICD-10-CM

## 2024-01-24 DIAGNOSIS — M62.08 SEPARATION OF MUSCLE (NONTRAUMATIC), OTHER SITE: ICD-10-CM

## 2024-01-24 PROCEDURE — 99213 OFFICE O/P EST LOW 20 MIN: CPT

## 2024-01-24 NOTE — ASSESSMENT
[FreeTextEntry1] : Twila returns to the office approximately 14 months after the repair of her umbilical and ventral hernias with mesh with complaints of discomfort above her umbilicus, which started after she was in severe abdominal pain at an [facility] Emergency room where she was diagnosed with diverticulitis.  She had imaging done which did not demonstrate an abdominal hernia.   Physical examination demonstrates a well-healed scar in the umbilical region with no evidence of hernia recurrence or delayed wound complications. There is no evidence of a ventral, epigastric or spigelian hernia. She does have a moderate to large diastasis recti which is likely related to her excess abdominal weight and her 2 previous full-term pregnancies in the past.  She has lost weight since her last visit and her current BMI is 29.  Twila was counseled and reassured. We discussed the importance of wearing an abdominal binder during any significant physical activity. I believe her diastasis will improve dramatically with weight loss and we discussed an  exercise regimen which may minimize the risk of worsening this condition such as the Tupler Technique. If she develops any signs or symptoms consistent with a true hernia she will return to me for reevaluation, of course.

## 2024-01-24 NOTE — CONSULT LETTER
[Dear  ___] : Dear  [unfilled], [Courtesy Letter:] : I had the pleasure of seeing your patient, [unfilled], in my office today. [Please see my note below.] : Please see my note below. [Consult Closing:] : Thank you very much for allowing me to participate in the care of this patient.  If you have any questions, please do not hesitate to contact me. [FreeTextEntry3] :    Alma Mcintyre PA-C, TIMMYAS [Sincerely,] : Sincerely,

## 2024-01-24 NOTE — PHYSICAL EXAM
[JVD] : no jugular venous distention  [Respiratory Effort] : normal respiratory effort [No Rash or Lesion] : No rash or lesion [Calm] : calm [Alert] : alert [de-identified] : overweight [de-identified] : normal [de-identified] : moderately protuberant abdomen, moderate diastasis recti [de-identified] : no hernia recurrence

## 2024-05-13 NOTE — ED ADULT TRIAGE NOTE - SPO2 (%)
-- DO NOT REPLY / DO NOT REPLY ALL --  -- Message is from Engagement Center Operations ([facility]) --    General Patient Message: The patient said she is now starting Ozempic TODAY and is asking if she needs to take the Humalog & Glargine as well. Please contact her as soon as possible     Caller Information         Type Contact Phone/Fax    03/07/2024 09:16 AM CST Phone (Incoming) Karine Krishnan (Self) [phone] (M)          Alternative phone number: NONE    Can a detailed message be left? Yes    Message Turnaround:     Is it Working Hours? Yes - Working Hours                     
[place]
I spoke with patient. Advised she will take insulin with ozempic. Patient also needs metformin refilled. Metformin sent in. Pt voiced understanding.  
100

## 2024-05-28 NOTE — ED PROVIDER NOTE - NS ED ROS FT
Constitutional: no fever, chills, no recent weight loss, change in appetite or malaise  Eyes: no redness/discharge/pain/vision changes  ENT: no rhinorrhea/ear pain/sore throat  Cardiac: No chest pain, SOB or edema.  Respiratory: No cough or respiratory distress  GI: see HPI  : No dysuria, frequency, urgency or hematuria  MS: no pain to back or extremities, no loss of ROM, no weakness  Neuro: No headache or weakness. No LOC.  Skin: No skin rash.  Endocrine: No history of thyroid disease or diabetes.  Except as documented in the HPI, all other systems are negative.
Yes

## 2024-06-03 ENCOUNTER — APPOINTMENT (OUTPATIENT)
Dept: SURGERY | Facility: CLINIC | Age: 34
End: 2024-06-03
Payer: COMMERCIAL

## 2024-06-03 VITALS
HEART RATE: 83 BPM | BODY MASS INDEX: 29.35 KG/M2 | TEMPERATURE: 97 F | OXYGEN SATURATION: 97 % | HEIGHT: 70 IN | SYSTOLIC BLOOD PRESSURE: 100 MMHG | WEIGHT: 205 LBS | DIASTOLIC BLOOD PRESSURE: 78 MMHG

## 2024-06-03 DIAGNOSIS — Z87.19 PERSONAL HISTORY OF OTHER DISEASES OF THE DIGESTIVE SYSTEM: ICD-10-CM

## 2024-06-03 DIAGNOSIS — Z80.3 FAMILY HISTORY OF MALIGNANT NEOPLASM OF BREAST: ICD-10-CM

## 2024-06-03 PROCEDURE — 99212 OFFICE O/P EST SF 10 MIN: CPT

## 2024-06-03 PROCEDURE — 99202 OFFICE O/P NEW SF 15 MIN: CPT

## 2024-06-04 PROBLEM — Z80.3 FAMILY HISTORY OF MALIGNANT NEOPLASM OF BREAST: Status: ACTIVE | Noted: 2024-06-03

## 2024-06-04 PROBLEM — Z87.19 HISTORY OF GASTROESOPHAGEAL REFLUX (GERD): Status: RESOLVED | Noted: 2024-06-03 | Resolved: 2024-06-04

## 2024-06-04 RX ORDER — OMEPRAZOLE 20 MG/1
20 CAPSULE, DELAYED RELEASE ORAL
Refills: 0 | Status: ACTIVE | COMMUNITY

## 2024-06-04 NOTE — PHYSICAL EXAM
[Normal Thyroid] : the thyroid was normal [Normal Breath Sounds] : Normal breath sounds [Normal Heart Sounds] : normal heart sounds [Normal Rate and Rhythm] : normal rate and rhythm [de-identified] : Healthy [de-identified] : No supraclavicular, axillary or cervical adenopathy [de-identified] : 8 mm noninflamed intradermal inclusion cyst at the base of the right neck laterally.  No discharge.

## 2024-06-04 NOTE — ASSESSMENT
[FreeTextEntry1] : Noninflamed but bothersome inclusion cyst of the right neck which can be excised in the office under local anesthesia.  She wishes to have this done promptly, the procedure was explained in full, and a surgical consent was obtained.  All her questions were answered and she will contact the office for scheduling.

## 2024-06-04 NOTE — HISTORY OF PRESENT ILLNESS
[de-identified] : The patient comes to be evaluated for excision of an inclusion cyst of her right neck.  She has had a small stable lump here for about 3 years and it is bothersome with certain clothing.  There is no history of local trauma, infection, or surgery

## 2024-07-01 NOTE — ED ADULT TRIAGE NOTE - AS HEIGHT TYPE
07/01/24 0920   Right Leg Edema Point of Measurement   Leg circumference 41.5 cm   Ankle circumference 24.2 cm   Compression Therapy Tubular elastic support bandage   Left Leg Edema Point of Measurement   Leg circumference 40 cm   Ankle circumference 25 cm   Compression Therapy Tubular elastic support bandage   Wound 02/05/24 Leg Lateral;Lower;Right #2   Date First Assessed/Time First Assessed: 02/05/24 0851   Present on Original Admission: Yes  Wound Approximate Age at First Assessment (Weeks): 4 weeks  Primary Wound Type: Venous Ulcer  Location: Leg  Wound Location Orientation: Lateral;Lower;Right  ...   Wound Image    Wound Etiology Venous   Dressing Status Old drainage noted   Wound Cleansed Soap and water   Wound Length (cm) 4 cm   Wound Width (cm) 2 cm   Wound Depth (cm) 0.1 cm   Wound Surface Area (cm^2) 8 cm^2   Change in Wound Size % (l*w) 39.39   Wound Volume (cm^3) 0.8 cm^3   Wound Healing % 39   Wound Assessment Clinchco/red;Slough   Drainage Amount Moderate (25-50%)   Drainage Description Serosanguinous   Odor None   Allison-wound Assessment Hemosiderin staining (brown yellow)   Margins Defined edges   Wound Thickness Description not for Pressure Injury Full thickness   Wound 02/05/24 Leg Anterior;Left #3   Date First Assessed/Time First Assessed: 02/05/24 0853   Present on Original Admission: Yes  Wound Approximate Age at First Assessment (Weeks): 4 weeks  Primary Wound Type: Venous Ulcer  Location: Leg  Wound Location Orientation: Anterior;Left  Wound ...   Wound Image    Wound Etiology Venous   Dressing Status Old drainage noted   Wound Cleansed Soap and water   Wound Length (cm) 5 cm   Wound Width (cm) 7.5 cm   Wound Depth (cm) 0.1 cm   Wound Surface Area (cm^2) 37.5 cm^2   Change in Wound Size % (l*w) -29915   Wound Volume (cm^3) 3.75 cm^3   Wound Healing % -93624   Wound Assessment Clinchco/red;Slough   Drainage Amount Moderate (25-50%)   Drainage Description Serosanguinous   Odor None   Allison-wound 
   07/01/24 0955   Right Leg Edema Point of Measurement   Compression Therapy Tubular elastic support bandage   Left Leg Edema Point of Measurement   Compression Therapy Tubular elastic support bandage   Wound 02/05/24 Leg Lateral;Lower;Right #2   Date First Assessed/Time First Assessed: 02/05/24 0851   Present on Original Admission: Yes  Wound Approximate Age at First Assessment (Weeks): 4 weeks  Primary Wound Type: Venous Ulcer  Location: Leg  Wound Location Orientation: Lateral;Lower;Right  ...   Dressing Status New dressing applied   Dressing/Treatment Alginate with Ag;ABD;Roll gauze;Tape/Soft cloth adhesive tape   Wound 02/05/24 Leg Anterior;Left #3   Date First Assessed/Time First Assessed: 02/05/24 0853   Present on Original Admission: Yes  Wound Approximate Age at First Assessment (Weeks): 4 weeks  Primary Wound Type: Venous Ulcer  Location: Leg  Wound Location Orientation: Anterior;Left  Wound ...   Dressing Status New dressing applied   Dressing/Treatment Alginate with Ag;ABD;Roll gauze;Tape/Soft cloth adhesive tape       
stated

## 2024-07-02 ENCOUNTER — APPOINTMENT (OUTPATIENT)
Dept: SURGERY | Facility: CLINIC | Age: 34
End: 2024-07-02
Payer: COMMERCIAL

## 2024-07-02 ENCOUNTER — LABORATORY RESULT (OUTPATIENT)
Age: 34
End: 2024-07-02

## 2024-07-02 VITALS
OXYGEN SATURATION: 98 % | DIASTOLIC BLOOD PRESSURE: 78 MMHG | SYSTOLIC BLOOD PRESSURE: 104 MMHG | TEMPERATURE: 97.1 F | WEIGHT: 190 LBS | HEART RATE: 75 BPM | HEIGHT: 70 IN | BODY MASS INDEX: 27.2 KG/M2

## 2024-07-02 DIAGNOSIS — L72.0 EPIDERMAL CYST: ICD-10-CM

## 2024-07-02 PROCEDURE — 11422 EXC H-F-NK-SP B9+MARG 1.1-2: CPT

## 2024-07-14 ENCOUNTER — NON-APPOINTMENT (OUTPATIENT)
Age: 34
End: 2024-07-14

## 2024-07-15 ENCOUNTER — APPOINTMENT (OUTPATIENT)
Dept: SURGERY | Facility: CLINIC | Age: 34
End: 2024-07-15
Payer: COMMERCIAL

## 2024-07-15 VITALS
TEMPERATURE: 97 F | DIASTOLIC BLOOD PRESSURE: 78 MMHG | HEART RATE: 59 BPM | SYSTOLIC BLOOD PRESSURE: 124 MMHG | OXYGEN SATURATION: 98 % | HEIGHT: 70 IN | WEIGHT: 190 LBS | BODY MASS INDEX: 27.2 KG/M2

## 2024-07-15 DIAGNOSIS — D23.4 OTHER BENIGN NEOPLASM OF SKIN OF SCALP AND NECK: ICD-10-CM

## 2024-07-15 PROCEDURE — 99024 POSTOP FOLLOW-UP VISIT: CPT

## 2024-07-17 PROBLEM — D23.4: Status: ACTIVE | Noted: 2024-07-17

## 2024-09-30 NOTE — ED ADULT NURSE NOTE - ALCOHOL PRE SCREEN (AUDIT - C)
Continue all current medications  Continue to weigh yourself daily and record your weight  Maintain a low sodium diet (2000 mg) and 2 L fluid restriction   Call the CHF clinic at [phone] if there is an increase in shortness of breath, lower extremity edema or weight gain of more than 2 to 3 pounds in a day or 5 pounds in a week     Statement Selected

## 2024-10-22 ENCOUNTER — APPOINTMENT (OUTPATIENT)
Dept: SURGERY | Facility: CLINIC | Age: 34
End: 2024-10-22

## 2024-10-23 ENCOUNTER — APPOINTMENT (OUTPATIENT)
Facility: CLINIC | Age: 34
End: 2024-10-23
Payer: COMMERCIAL

## 2024-10-23 VITALS — BODY MASS INDEX: 27.49 KG/M2 | WEIGHT: 192 LBS | HEIGHT: 70 IN

## 2024-10-23 DIAGNOSIS — M54.50 LOW BACK PAIN, UNSPECIFIED: ICD-10-CM

## 2024-10-23 PROCEDURE — 72110 X-RAY EXAM L-2 SPINE 4/>VWS: CPT

## 2024-10-23 PROCEDURE — 99203 OFFICE O/P NEW LOW 30 MIN: CPT

## 2024-11-01 ENCOUNTER — APPOINTMENT (OUTPATIENT)
Dept: MRI IMAGING | Facility: CLINIC | Age: 34
End: 2024-11-01

## 2024-11-06 ENCOUNTER — APPOINTMENT (OUTPATIENT)
Dept: PAIN MANAGEMENT | Facility: CLINIC | Age: 34
End: 2024-11-06

## 2024-12-05 ENCOUNTER — APPOINTMENT (OUTPATIENT)
Dept: NEUROLOGY | Facility: CLINIC | Age: 34
End: 2024-12-05

## 2024-12-07 NOTE — ASU PREOP CHECKLIST - NOTHING BY MOUTH SINCE
Chief Complaint   Patient presents with    Sore Throat     X 4 days, white spots x yesterday    Congestion     X 1week    Cough    fatigue       Jason is a 39 year old old female  who presents to [facility] with complaints of  cough, sore throat, runny nose, and congestion,  which started several days ago.   She denies:fever and chills.   Denies SOB, or pleuritic CP. Has chronic wrist rash intermittently   History of allergic rhinitis No  PMH:  None          ALLERGIES: ALLERGIES:  Nut - multiple   (food), Peanut   (food or med), Metronidazole, and Penicillins    Family History: not contributory      OBJECTIVE  Blood pressure 114/76, pulse 89, temperature 98.5 °F (36.9 °C), temperature source Tympanic, resp. rate 16, last menstrual period 11/05/2024, SpO2 98%.  General appearance: alert & oriented, pleasant and comfortable  Eyes: conjunctiva without injection  Ears: External ears normal. Canals clear. TM's normal.  Nose: Nares normal. Septum midline. Mucosa normal. No drainage or sinus tenderness.  Throat:  Posterior pharynx with mild erythema with thin PND  Neck: no lymphadenopathy or thyromegaly, supple able to touch chin to chest  Lungs: clear to auscultation  Heart: RRR, no extra HS, murmurs, or rubs    ASSESSMENT  URI, Probable viral  Acute sinusitis  rash    PLAN  Meds as advised  Ibuprofen or tylenol as needed for fever, unless contraindicated  followup with [facility] if symptoms worsen, or with PCP preferably if symptoms not improved in 48 to 72 hrs   05-Jun-2020 00:00

## 2025-02-27 ENCOUNTER — APPOINTMENT (OUTPATIENT)
Dept: PAIN MANAGEMENT | Facility: CLINIC | Age: 35
End: 2025-02-27
Payer: COMMERCIAL

## 2025-02-27 DIAGNOSIS — M54.16 RADICULOPATHY, LUMBAR REGION: ICD-10-CM

## 2025-02-27 PROCEDURE — 99204 OFFICE O/P NEW MOD 45 MIN: CPT

## 2025-02-27 RX ORDER — METHYLPREDNISOLONE 4 MG/1
4 TABLET ORAL
Qty: 1 | Refills: 0 | Status: ACTIVE | COMMUNITY
Start: 2025-02-27 | End: 1900-01-01

## 2025-02-27 RX ORDER — MELOXICAM 15 MG/1
15 TABLET ORAL
Qty: 30 | Refills: 0 | Status: ACTIVE | COMMUNITY
Start: 2025-02-27 | End: 1900-01-01

## 2025-02-28 NOTE — ED ADULT NURSE NOTE - NSFALLRSKHARMRISK_ED_ALL_ED
Lovenox injections    Patient has brusing to left side of abdomen where she had gotten injections while in rehab.  Area size of 3.5x2.5 inches, purple in color,is tender with touching, not expanding.  Not helped by Tylenol.  Discussed with spouse avoiding area with injections until healed, gentle use on warm compresses, alternating future injection.  He verbalizes understanding will call if needed.   no

## 2025-03-27 ENCOUNTER — RX RENEWAL (OUTPATIENT)
Age: 35
End: 2025-03-27

## 2025-04-14 ENCOUNTER — APPOINTMENT (OUTPATIENT)
Dept: PAIN MANAGEMENT | Facility: CLINIC | Age: 35
End: 2025-04-14

## 2025-05-06 ENCOUNTER — RX RENEWAL (OUTPATIENT)
Age: 35
End: 2025-05-06

## 2025-07-31 NOTE — ED PROVIDER NOTE - PRO INTERPRETER NEED 2
[facility]  _______________________________________  Terry Burnett MD                 [address] [place] [address]        Noam Jean Baptiste MD                     [place] [address]                                                                                    Phone: [phone]                                                                                    Fax: [phone]    Mya Sullivan is a 77 y.o. female who is seen for evaluation of   Chief Complaint   Patient presents with    Injections     R knee    Results     Xray        HPI:   - pre-diabetes- she stopped metformin 2/2 nightmares.     Knee Pain   The incident occurred 2 days ago. The incident occurred at home. Injury mechanism: flexion with weight getting up from chair. The pain is present in the right knee. The quality of the pain is described as aching. The pain is moderate. The pain has been Fluctuating since onset. Pertinent negatives include no inability to bear weight, loss of motion, loss of sensation, muscle weakness or numbness. The symptoms are aggravated by movement. She has tried nothing for the symptoms.     Xray Result (most recent):  XR KNEE RIGHT (3 VIEWS) 07/28/2025    Narrative  Right Knee    INDICATION: Pain in right knee    COMPARISON:  None    TECHNIQUE: Three views of the right knee were obtained.    FINDINGS: Moderate medial and patellofemoral joint space narrowing with  enthesophyte formation. There is no evidence of fracture or other acute bony  abnormality. No bony lesions are seen. There is no joint effusion.    Impression  No acute fracture or dislocation of the right knee.  Moderate medial and patellofemoral degenerative changes of the right knee.      Electronically signed by Raiza Andres MD      Hemoglobin A1C   Date Value Ref Range Status   07/09/2025 5.9 (H) 0 - 5.6 % Final     Comment:     Reference Range  Normal       <5.7%  Prediabetes  5.7-6.4%  Diabetes     >6.4%         Review 
English